# Patient Record
Sex: FEMALE | Race: WHITE | NOT HISPANIC OR LATINO
[De-identification: names, ages, dates, MRNs, and addresses within clinical notes are randomized per-mention and may not be internally consistent; named-entity substitution may affect disease eponyms.]

---

## 2019-05-14 ENCOUNTER — FORM ENCOUNTER (OUTPATIENT)
Age: 54
End: 2019-05-14

## 2019-05-22 ENCOUNTER — FORM ENCOUNTER (OUTPATIENT)
Age: 54
End: 2019-05-22

## 2019-06-03 ENCOUNTER — FORM ENCOUNTER (OUTPATIENT)
Age: 54
End: 2019-06-03

## 2019-06-09 ENCOUNTER — FORM ENCOUNTER (OUTPATIENT)
Age: 54
End: 2019-06-09

## 2019-06-11 ENCOUNTER — FORM ENCOUNTER (OUTPATIENT)
Age: 54
End: 2019-06-11

## 2019-06-24 NOTE — HP
Admitting History and Physical





- Primary Care Physician


PCP: Josfaat Berger





- Admission


Chief Complaint: Left breast cancer


History of Present Illness: 





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 

sister BRCA 1 positive with breast cancer at age 60 and a brother BRCA1 

positive with pancreatic cancer.She underwent screening mammogram and US 5/2019 

showing calcifications and slight associated density UIQ .9cm x1.5x1.1cm  at 1:

00 2 cm FN was seen in  left breast  on  US. There was also a separate 

suspicious densoity left breast 11:00 3 cm FN 5x5 mm. US core bx of both of 

these showed on 5/2019 invasive mammary carcinoma with mixed ductal and lobular 

features ER+/MS+ and HER2-. Breast MRI showed localized left breast  cancer and 

bno adenopathy.


History Source: Patient


Limitations to Obtaining History: No Limitations





- Past Medical History


Additional Past Medical History: 





Obesity





- Past Surgical History


Additional Past Surgical History: 





right thigh excision  lipoma


right core bx LCIS 2016 no excision follow up wnl





- Smoking History


Smoking history: Never smoked


Have you smoked in the past 12 months: No





- Alcohol/Substance Use


Hx Alcohol Use: Yes (social)





Home Medications





- Allergies


Allergies/Adverse Reactions: 


 Allergies











Allergy/AdvReac Type Severity Reaction Status Date / Time


 


No Known Allergies Allergy   Verified 06/24/19 13:39














Family Disease History





- Family Disease History


Family Disease History: CA: Grandparent (pat GM ovarian ca 40), Father (CRC 68)

, Mother (lung ca), Brother (pancreatic ca BRCA1+// bother prostate ca 65), 

Sister (breast ca BRCA1+)


Other Family History: pat uncle breast ca 80





Physical Examination


Constitutional: Yes: Obese


Breast(s): Yes: Other ( ptotic D cup breastspost bx scarring changes upper 

aspect left breast no adenopathy or palpated masses)





Problem List





- Problems


(1) Breast cancer, left breast


Code(s): C50.912 - MALIGNANT NEOPLASM OF UNSPECIFIED SITE OF LEFT FEMALE BREAST

   


Qualifiers: 


   Breast location: upper inner quadrant of breast   Estrogen receptor status: 

positive   Patient sex: female   Qualified Code(s): C50.212 - Malignant 

neoplasm of upper-inner quadrant of left female breast; Z17.0 - Estrogen 

receptor positive status [ER+]   





Assessment/Plan





Bilateral total mastectomies left sentenel node biopsy ,lymphoscintogram,

possible axillary node dissection KADI

## 2019-06-26 ENCOUNTER — FORM ENCOUNTER (OUTPATIENT)
Age: 54
End: 2019-06-26

## 2019-06-27 ENCOUNTER — HOSPITAL ENCOUNTER (INPATIENT)
Dept: HOSPITAL 74 - JSAMEDAYSX | Age: 54
LOS: 3 days | Discharge: HOME | DRG: 580 | End: 2019-06-30
Attending: SURGERY | Admitting: SURGERY
Payer: COMMERCIAL

## 2019-06-27 VITALS — BODY MASS INDEX: 48.9 KG/M2

## 2019-06-27 DIAGNOSIS — C77.3: ICD-10-CM

## 2019-06-27 DIAGNOSIS — Z17.0: ICD-10-CM

## 2019-06-27 DIAGNOSIS — E66.01: ICD-10-CM

## 2019-06-27 DIAGNOSIS — C50.412: ICD-10-CM

## 2019-06-27 DIAGNOSIS — C50.212: Primary | ICD-10-CM

## 2019-06-27 PROCEDURE — A9541 TC99M SULFUR COLLOID: HCPCS

## 2019-06-27 PROCEDURE — 0HTV0ZZ RESECTION OF BILATERAL BREAST, OPEN APPROACH: ICD-10-PCS | Performed by: SURGERY

## 2019-06-27 PROCEDURE — 0HRV077 REPLACEMENT OF BILATERAL BREAST USING DEEP INFERIOR EPIGASTRIC ARTERY PERFORATOR FLAP, OPEN APPROACH: ICD-10-PCS | Performed by: PLASTIC SURGERY

## 2019-06-27 PROCEDURE — 07B60ZX EXCISION OF LEFT AXILLARY LYMPHATIC, OPEN APPROACH, DIAGNOSTIC: ICD-10-PCS | Performed by: SURGERY

## 2019-06-27 RX ADMIN — CEFAZOLIN SCH MLS/HR: 1 INJECTION, POWDER, FOR SOLUTION INTRAVENOUS at 23:42

## 2019-06-27 RX ADMIN — ACETAMINOPHEN PRN MG: 10 INJECTION, SOLUTION INTRAVENOUS at 19:45

## 2019-06-27 RX ADMIN — ASPIRIN 325 MG ORAL TABLET SCH MG: 325 PILL ORAL at 19:30

## 2019-06-27 RX ADMIN — DOCUSATE SODIUM SCH: 100 CAPSULE, LIQUID FILLED ORAL at 23:43

## 2019-06-27 RX ADMIN — Medication SCH: at 19:53

## 2019-06-27 RX ADMIN — Medication SCH MG: at 18:35

## 2019-06-27 RX ADMIN — ACETAMINOPHEN PRN MG: 10 INJECTION, SOLUTION INTRAVENOUS at 23:44

## 2019-06-27 NOTE — CONSULT
Consultation: 


CONSULT SERVICE: ICU Resident





HISTORY OF PRESENT ILLNESS:


   52yo F with no significant history who presents today after b/l mastectomy 

with reconstruction in KADI flap creation and sentinel node resection performed 

by Dr. Berger. Mastectomy was performed due to pt having 1st degree relative 

BRCA I positive with multiple masses seen on mammography and follow-up 

ultrasound. Pt is placed in ICU for doppler hourly check of KADI flap. Pt's 

procedure was uncomplicated, had minimal EBL, and received 4L of IVF 

intraoperatively. Pt recovered without complication and is doing well with no 

complaints of pain at this time. 





FamHx:


   Sister - BRCA 1 +; Breast Ca 61yo


   Brother - BRCA 1 +; pancreatic Ca











REVIEW OF SYSTEMS:


As per HPI





PHYSICAL EXAMINATION





 Vital Signs 











  06/27/19 06/27/19 06/27/19





  06:49 06:50 18:21


 


Temperature 98.5 F  98.1 F


 


Pulse Rate 75  64


 


Respiratory 18  14





Rate   


 


Blood Pressure 140/88  112/46 L


 


O2 Sat by Pulse  99 100





Oximetry (%)   














  06/27/19 06/27/19 06/27/19





  18:35 18:50 19:05


 


Temperature   


 


Pulse Rate 67 70 69


 


Respiratory 14 16 18





Rate   


 


Blood Pressure 98/61 110/57 L 106/58 L


 


O2 Sat by Pulse 97 98 100





Oximetry (%)   














  06/27/19 06/27/19 06/27/19





  19:20 19:35 19:50


 


Temperature   


 


Pulse Rate 67 65 70


 


Respiratory 16 14 14





Rate   


 


Blood Pressure 107/64 115/60 104/66


 


O2 Sat by Pulse 100 100 100





Oximetry (%)   














  06/27/19 06/27/19





  20:05 20:20


 


Temperature  


 


Pulse Rate 66 65


 


Respiratory 14 14





Rate  


 


Blood Pressure 115/60 119/67


 


O2 Sat by Pulse 100 100





Oximetry (%)  














GENERAL: NAD, Awake, alert, and fully oriented 


HEENT: NC/AT, EOMI, LIN, sclera anicteric, MMM


NECK: No JVD 


LUNGS: Posterior lung auscultation CTA b/l. No wheezes, and no crackles. No 

accessory muscle use. RA SpO2 98%


DOPPLERS: strong doppler sounds to b/l KADI flaps


HEART: RRR, normal S1 and S2 without murmurs


ABDOMEN: Soft, NT/ND, hypoactive bowel sounds, no guarding, no rebound.


EXTREMITIES: 2+ DP pulses, warm, well-perfused. No calf tenderness. No edema. 


NEUROLOGICAL:  CNs II-XII intact. Strength in all extremities 5/5. Sensation 

intact throughout. Normal speech. Gait not observed


PSYCHIATRIC: Cooperative. Good eye contact. Appropriate mood and affect.


SKIN: Warm, dry, no rashes. B/l incisions of chest without notable bleed.











 Laboratory Results - last 24 hr











  06/27/19 06/27/19 06/27/19





  06:34 06:34 07:20


 


Serum Pregnancy, Qual  Negative  


 


Blood Type   O POSITIVE  O POSITIVE


 


Antibody Screen   Negative 








Active Medications











Generic Name Dose Route Start Last Admin





  Trade Name Freq  PRN Reason Stop Dose Admin


 


Acetaminophen  1,000 mg  06/27/19 19:33  06/27/19 19:45





  Ofirmev Injection -  IVPB   1,000 mg





  Q6H PRN   Administration





  PAIN LEVEL 1-5   





     





     





     


 


Aspirin  325 mg  06/28/19 10:00  06/27/19 19:30





  Asa -  PO   325 mg





  DAILY JACQUELINE   Administration





     





     





     





     


 


Diazepam  5 mg  06/27/19 18:00  





  Valium -  PO   





  Q8H PRN   





  MUSCLE SPASMS   





     





     





     


 


Docusate Sodium  100 mg  06/27/19 22:00  





  Colace -  PO   





  BID [facility]   





     





     





     





     


 


Enoxaparin Sodium  30 mg  06/28/19 10:00  





  Lovenox -  SQ   





  BID [facility]   





     





     





     





     


 


Fentanyl  50 mcg  06/27/19 10:08  





  Sublimaze Injection -  IVPUSH   





  [id number] PRN   





  PAIN-PACU ORDER X 4 DOSES ONLY   





     





     





     


 


Dextrose/Sodium Chloride  1,000 mls @ 125 mls/hr  06/27/19 18:00  06/27/19 18:35





  D5-1/2ns -  IV   125 mls/hr





  ASDIR JACQUELINE   Administration





     





     





     





     


 


Cefazolin Sodium 1 gm/  50 mls @ 100 mls/hr  06/27/19 23:30  





  Dextrose  IVPB  06/28/19 23:29  





  Q6H JACQUELINE   





     





     





     





     


 


Ondansetron HCl  4 mg  06/27/19 10:08  





  Zofran Injection  IVPUSH   





  Q6H PRN   





  NAUSEA AND/OR VOMITING   





     





     





     


 


Oxycodone HCl  10 mg  06/27/19 18:00  





  Roxicodone -  PO   





  Q4H PRN   





  PAIN LEVEL 6-10   





     





     





     











ASSESSMENT/PLAN:


B/l Mastectomy with KADI flap creation and sentinel node resection





--Monitor in ICU due to acute doppler checks (q1h)


   --Ideal MAPs of 70 with tight control to avoid underperfusion with KADI flap 

vs. overcongestion of perforators


--Maintain in beach-chair positioning to avoid excessive epigastric  

tension


--Pain well controlled due to Exporel tap


   --Will continue with Oxycodone 5mg/Oxycodone 10mg PRN depending on pt's 

severity


   --Tylenol IVPB on board for mild pain


   --Valium 5mg PO on board PRN for muscle spasms if they develop


--Continue perioperative ABX per SCIP protocol


--Continue D5-1/2NS@125cc/hr while patient transitioning back to PO intake





FEN:   


   Fluids: As above


   Electrolyte abnormalities: AM basic labs


   Nutrition: Transition as patient tolerate and as per surgical team





PPX:


   DVT - Surgical team started on Lovenox 30mg BID SQ


   GI - Not indicated currently





Dispo: Doppler checks; monitor in ICU


Carlo Beckford DO - IM PGY-2





Visit type





- Emergency Visit


Emergency Visit: Yes


ED Registration Date: 06/27/19


Care time: The patient presented to the Emergency Department on the above date 

and was hospitalized for further evaluation of their emergent condition.





- New Patient


This patient is new to me today: Yes


Date on this admission: 06/28/19





- Critical Care


Critical Care patient: Yes


Total Critical Care Time (in minutes): 35


Critical Care Statement: The care of this patient involved high complexity 

decision making to prevent further life threatening deterioration of the patient

's condition and/or to evaluate & treat vital organ system(s) failure or risk 

of failure.

## 2019-06-28 LAB
ANION GAP SERPL CALC-SCNC: 6 MMOL/L (ref 8–16)
BUN SERPL-MCNC: 23 MG/DL (ref 7–18)
CALCIUM SERPL-MCNC: 7.8 MG/DL (ref 8.5–10.1)
CHLORIDE SERPL-SCNC: 107 MMOL/L (ref 98–107)
CO2 SERPL-SCNC: 23 MMOL/L (ref 21–32)
CREAT SERPL-MCNC: 0.9 MG/DL (ref 0.55–1.3)
DEPRECATED RDW RBC AUTO: 14 % (ref 11.6–15.6)
GLUCOSE SERPL-MCNC: 138 MG/DL (ref 74–106)
HCT VFR BLD CALC: 30.7 % (ref 32.4–45.2)
HGB BLD-MCNC: 10.4 GM/DL (ref 10.7–15.3)
MCH RBC QN AUTO: 32.4 PG (ref 25.7–33.7)
MCHC RBC AUTO-ENTMCNC: 33.9 G/DL (ref 32–36)
MCV RBC: 95.6 FL (ref 80–96)
PLATELET # BLD AUTO: 197 K/MM3 (ref 134–434)
PMV BLD: 8.6 FL (ref 7.5–11.1)
POTASSIUM SERPLBLD-SCNC: 4.2 MMOL/L (ref 3.5–5.1)
RBC # BLD AUTO: 3.21 M/MM3 (ref 3.6–5.2)
SODIUM SERPL-SCNC: 136 MMOL/L (ref 136–145)
WBC # BLD AUTO: 13.3 K/MM3 (ref 4–10)

## 2019-06-28 RX ADMIN — ACETAMINOPHEN PRN MG: 10 INJECTION, SOLUTION INTRAVENOUS at 05:40

## 2019-06-28 RX ADMIN — ACETAMINOPHEN PRN MG: 10 INJECTION, SOLUTION INTRAVENOUS at 11:42

## 2019-06-28 RX ADMIN — ENOXAPARIN SODIUM SCH MG: 30 INJECTION SUBCUTANEOUS at 10:00

## 2019-06-28 RX ADMIN — DOCUSATE SODIUM SCH MG: 100 CAPSULE, LIQUID FILLED ORAL at 22:15

## 2019-06-28 RX ADMIN — ASPIRIN 325 MG ORAL TABLET SCH MG: 325 PILL ORAL at 10:00

## 2019-06-28 RX ADMIN — CEFAZOLIN SCH MLS/HR: 1 INJECTION, POWDER, FOR SOLUTION INTRAVENOUS at 18:33

## 2019-06-28 RX ADMIN — ENOXAPARIN SODIUM SCH MG: 30 INJECTION SUBCUTANEOUS at 22:15

## 2019-06-28 RX ADMIN — ACETAMINOPHEN PRN MG: 500 TABLET, FILM COATED ORAL at 19:49

## 2019-06-28 RX ADMIN — CEFAZOLIN SCH MLS/HR: 1 INJECTION, POWDER, FOR SOLUTION INTRAVENOUS at 05:47

## 2019-06-28 RX ADMIN — DOCUSATE SODIUM SCH MG: 100 CAPSULE, LIQUID FILLED ORAL at 10:00

## 2019-06-28 RX ADMIN — DEXTROSE AND SODIUM CHLORIDE SCH MLS/HR: 5; 450 INJECTION, SOLUTION INTRAVENOUS at 12:00

## 2019-06-28 RX ADMIN — CEFAZOLIN SCH MLS/HR: 1 INJECTION, POWDER, FOR SOLUTION INTRAVENOUS at 11:42

## 2019-06-28 NOTE — PN
Progress Note (short form)





- Note


Progress Note: 





Anesthesia POD#1


S/P Bilateral mastectomy with flap reconstruction under GA


VSS,no N/V,using Dilaudid PCA for pain,drowsy at this time.


Not taking PO meds yet.





A/P


Doing well. Continue IV PCA for today.





Shraddha Ragsdale MD

## 2019-06-28 NOTE — PN
Progress Note (short form)





- Note


Progress Note: 











POD 1, s/p B/l total mastectomies w/ L axillary sentinel lymph node biopsy/L 

axillary lymph node dissection with B/L KADI flap reconstruction for L breast CA








Pt seen and examined. States she is not having any pain currently. Reports lack 

of sleep overnight due to flap checks. Tolerating clears, no n/v. Cline in 

place. No complaints or issues overnight. Denies cp/sob, calf pain/edema. 

















 Vital Signs











Temp  98.8 F   06/28/19 05:21


 


Pulse  77   06/28/19 06:21


 


Resp  25 H  06/28/19 06:21


 


BP  115/65   06/28/19 06:21


 


Pulse Ox  95   06/28/19 03:50








 Intake & Output











 06/27/19 06/27/19 06/28/19





 11:59 23:59 11:59


 


Intake Total 4000 300 1595


 


Output Total 650 735 880


 


Balance 3350 -435 715


 


Weight   312 lb


 


Intake:   


 


  IV 4000 300 875


 


    D5-1/2Ns - 1,000 ml @ 125   875





    mls/hr IV ASDIR JACQUELINE Rx#:   





    [id number]   


 


  Oral   720


 


Output:   


 


  Drainage  435 580


 


    #1 Right Upper Breast  30 60


 


    #2 Right Lower Abdomen  20 50


 


    #3 Left Upper Breast  15 95


 


    #4 Left Upper Breast  50 105


 


    Breast #4   5


 


    Left Lower Abdomen  15 60


 


    Left Upper Breast #3   95


 


    Right Upper Breast #1   60


 


    Rt Lower Abd #2   50


 


  Urine 400 300 300


 


    Cline   300


 


  Estimated Blood Loss 250  


 


Other:   


 


  Voiding Method   Indwelling Catheter


 


  Bowel Movement   No: + flatus


 


  Weight Measurement Method   Built in [facility]








 CBC, BMP





 06/28/19 07:24 





 06/28/19 07:24 











Gen: awake, alert, nad


Resp: Unlabored on RA


Chest: Gabi hugger and bra in place, removed for exam. B/L flaps pale, remain 

warm to touch with Gabi hugger off for approx 15 mins (L slightly warmer than R)

. +Surrounding ecchymosis bilaterally. Strong doppler signal present on L at 

suture site and on R at mid upper flap. Incisions all intact with no drainage 

noted. R STANLEY with approx 15 ml serosanguinous drainage in reservoir, L breast 

with JPs x2 (#3 drain with scant serosanguinous drainage in reservoir, #4 drain 

with approx 100ml serosanguinous drainage in reservoir). All drains stripped. 


Abdo: Incision intact with no erythema or drainage noted. B/L STANLEY drains with 

minimal serosanginous output in reservoirs (both drains stripped)

















A/P: 52 y/o F w/ PMHx morbid obesity, L breast CA, now POD 1, s/p B/l total 

mastectomies w/ L axillary sentinel lymph node biopsy/L axillary lymph node 

dissection with B/L KADI flap reconstruction.








Afebrile, VSS. 


Labs wnl.


B/L flaps pale, however with excellent doppler signal and remain warm with Gabi 

hugger off for >10mins





STANLEY outputs:


#1 R Upper Breast: 60ml overnight, 90ml since OR


#2 R Lower Abdomen: 50ml overnight, 70ml since OR


#3 L Upper Breast: 95ml overnight, 110ml since OR


#4 L Upper Breast: 105ml overnight, 155ml since OR with additional 100ml 

emptied at time of exam


#5 L Lower Abdomen: 60ml overnight, 75ml since OR





Cline output: 300ml overnight 








-Continue to monitor pt in ICU


-Continue q1hrs doppler checks


-Keep Bare hugger in place at all times 


-Keep pt in beach chair position


-Pain control as ordered


-Continue periop abx 


-Keep JPs in place, strip drains qshift


-Keep cline in place


-Monitor I&Os


-Clear liquid diet, NO CAFFEINE OR CHOCOLATE


-Continue ASA 325mg qd


-DVT prophylaxis with b/l scds and Lovenox 30mg bid


-Bowel regimen


-Above d/w ICU resident, please CALL SURGERY immediately if any issues with 

doppler signals or any changes with flaps


-Will follow throughout the day





d/w attending Dr Echeverria

## 2019-06-28 NOTE — PN
Progress Note, Physician


Chief Complaint: 





Left breast cancer S/P bilateral total mastctomies left sentenel node biopsy 

KADI reconstruction POD #1


History of Present Illness: 





Patient is drinking, pain controlled with current medication, cline to be dcd 

when OOB fluids decreased as patient is drinking, diet to be advanced 





- Current Medication List


Current Medications: 


Active Medications





Acetaminophen (Ofirmev Injection -)  1,000 mg IVPB Q6H PRN


   PRN Reason: PAIN LEVEL 1-5


   Last Admin: 06/28/19 05:40 Dose:  1,000 mg


Aspirin (Asa -)  325 mg PO DAILY [facility]


   Last Admin: 06/28/19 10:00 Dose:  325 mg


Diazepam (Valium -)  5 mg PO Q8H PRN


   PRN Reason: MUSCLE SPASMS


Docusate Sodium (Colace -)  100 mg PO BID [facility]


   Last Admin: 06/28/19 10:00 Dose:  100 mg


Enoxaparin Sodium (Lovenox -)  30 mg SQ BID [facility]


   Last Admin: 06/28/19 10:00 Dose:  30 mg


Fentanyl (Sublimaze Injection -)  50 mcg IVPUSH [id number] PRN


   PRN Reason: PAIN-PACU ORDER X 4 DOSES ONLY


Dextrose/Sodium Chloride (D5-1/2ns -)  1,000 mls @ 125 mls/hr IV ASDIR [facility]


   Last Admin: 06/27/19 18:35 Dose:  125 mls/hr


Cefazolin Sodium 1 gm/ (Dextrose)  50 mls @ 100 mls/hr IVPB Q6H [facility]


   Stop: 06/28/19 23:29


   Last Admin: 06/28/19 05:47 Dose:  100 mls/hr


Ondansetron HCl (Zofran Injection)  4 mg IVPUSH Q6H PRN


   PRN Reason: NAUSEA AND/OR VOMITING


Oxycodone HCl (Roxicodone -)  10 mg PO Q4H PRN


   PRN Reason: PAIN LEVEL 6-10


Oxycodone HCl (Roxicodone -)  5 mg PO Q4H PRN


   PRN Reason: PAIN LEVEL 1-5


   Last Admin: 06/28/19 11:14 Dose:  5 mg











- Objective


Vital Signs: 


 Vital Signs











Temperature  99.5 F   06/28/19 08:00


 


Pulse Rate  78   06/28/19 10:00


 


Respiratory Rate  20   06/28/19 10:00


 


Blood Pressure  122/69   06/28/19 10:00


 


O2 Sat by Pulse Oximetry (%)  96   06/28/19 10:00











Constitutional: Yes: No Distress


Breast(s): Yes: Other (Bilateral flaps viable moderate echymosis ,good color  

Pulses strong and equal biphasic pulse, abdominal incision intact no signs of 

hematoma or infection drains milked and)


Labs: 


 CBC, BMP





 06/28/19 07:24 





 06/28/19 07:24 











Problem List





- Problems


(1) Breast cancer, left breast


Code(s): C50.912 - MALIGNANT NEOPLASM OF UNSPECIFIED SITE OF LEFT FEMALE BREAST

   


Qualifiers: 


   Breast location: upper inner quadrant of breast   Estrogen receptor status: 

positive   Patient sex: female   Qualified Code(s): C50.212 - Malignant 

neoplasm of upper-inner quadrant of left female breast; Z17.0 - Estrogen 

receptor positive status [ER+]   





Assessment/Plan





spirometry


SCD in bed Lovenox SQ BID


oxycodone for pain prn


DC son OOB in chair with assistance oper Dr echeverria


physical therapist for ambulation in flexed position at all times per Dr echeverria


monitor flap pulse Q2 hours per Dr echeverria


VNS


DC jluius gibbsgger per Dr Echeverria


advanced diet excluding chocolate and caffeine

## 2019-06-28 NOTE — PN
Physical Exam: 


SUBJECTIVE: Patient seen and examined at bedside.





No overnight events or complaints. AOX3 , NAD, sitting up in bed eating 

breakfast. Admits to passing flatus this am, making urine. Pt remains in beach-

chair position to reduce tension on the abdomen.








OBJECTIVE:





 Vital Signs











 Period  Temp  Pulse  Resp  BP Sys/Michele  Pulse Ox


 


 Last 24 Hr  97.7 F-98.8 F  62-77  14-25  /46-73  











GENERAL: The patient is awake, alert, and fully oriented, in no acute distress.


HEAD: Normal with no signs of trauma.


EYES: PERRL, extraocular movements intact, sclera anicteric, conjunctiva clear. 

No ptosis. 


membranes.


NECK: Trachea midline, full range of motion, supple. 


LUNGS: Breath sounds equal, clear to auscultation bilaterally, no wheezes, no 

crackles, no 


accessory muscle use. 


HEART: Regular rate and rhythm, S1, S2 without murmur, rub or gallop.


ABDOMEN: Soft, nontender, nondistended, normoactive bowel sounds, no guarding, 

no 


rebound, no hepatosplenomegaly, no masses.


EXTREMITIES: 2+ pulses, warm, well-perfused, no edema. 


NEUROLOGICAL: Cranial nerves II through XII grossly intact. Normal speech, gait 

not 


observed.


PSYCH: Normal mood, normal affect.


SKIN: Warm, dry, normal turgor, no rashes or lesions noted. Bilateral 

mastectomy with reconstruction with flap. Doppler positive for flow bilateral 

breast flap, no drainage.














 Laboratory Results - last 24 hr











  06/27/19 06/27/19 06/28/19





  06:34 07:20 07:24


 


WBC    13.3 H


 


RBC    3.21 L


 


Hgb    10.4 L


 


Hct    30.7 L


 


MCV    95.6


 


MCH    32.4


 


MCHC    33.9


 


RDW    14.0


 


Plt Count    197


 


MPV    8.6


 


Sodium   


 


Potassium   


 


Chloride   


 


Carbon Dioxide   


 


Anion Gap   


 


BUN   


 


Creatinine   


 


Est GFR (CKD-EPI)AfAm   


 


Est GFR (CKD-EPI)NonAf   


 


Random Glucose   


 


Calcium   


 


Blood Type  O POSITIVE  O POSITIVE 


 


Antibody Screen  Negative  














  06/28/19





  07:24


 


WBC 


 


RBC 


 


Hgb 


 


Hct 


 


MCV 


 


MCH 


 


MCHC 


 


RDW 


 


Plt Count 


 


MPV 


 


Sodium  136


 


Potassium  4.2


 


Chloride  107


 


Carbon Dioxide  23


 


Anion Gap  6 L


 


BUN  23.0 H


 


Creatinine  0.9


 


Est GFR (CKD-EPI)AfAm  84.61


 


Est GFR (CKD-EPI)NonAf  73.00


 


Random Glucose  138 H


 


Calcium  7.8 L


 


Blood Type 


 


Antibody Screen 








Active Medications











Generic Name Dose Route Start Last Admin





  Trade Name Freq  PRN Reason Stop Dose Admin


 


Acetaminophen  1,000 mg  06/27/19 19:33  06/28/19 05:40





  Ofirmev Injection -  IVPB   1,000 mg





  Q6H PRN   Administration





  PAIN LEVEL 1-5   





     





     





     


 


Aspirin  325 mg  06/28/19 10:00  06/27/19 19:30





  Asa -  PO   325 mg





  DAILY JACQUELINE   Administration





     





     





     





     


 


Diazepam  5 mg  06/27/19 18:00  





  Valium -  PO   





  Q8H PRN   





  MUSCLE SPASMS   





     





     





     


 


Docusate Sodium  100 mg  06/27/19 22:00  06/27/19 23:43





  Colace -  PO   Not Given





  BID JACQUELINE   





     





     





     





     


 


Enoxaparin Sodium  30 mg  06/28/19 10:00  





  Lovenox -  SQ   





  BID JACQUELINE   





     





     





     





     


 


Fentanyl  50 mcg  06/27/19 10:08  





  Sublimaze Injection -  IVPUSH   





  [id number] PRN   





  PAIN-PACU ORDER X 4 DOSES ONLY   





     





     





     


 


Dextrose/Sodium Chloride  1,000 mls @ 125 mls/hr  06/27/19 18:00  06/27/19 18:35





  D5-1/2ns -  IV   125 mls/hr





  ASDIR JACQUELINE   Administration





     





     





     





     


 


Cefazolin Sodium 1 gm/  50 mls @ 100 mls/hr  06/27/19 23:30  06/28/19 05:47





  Dextrose  IVPB  06/28/19 23:29  100 mls/hr





  Q6H JACQUELINE   Administration





     





     





     





     


 


Ondansetron HCl  4 mg  06/27/19 10:08  





  Zofran Injection  IVPUSH   





  Q6H PRN   





  NAUSEA AND/OR VOMITING   





     





     





     


 


Oxycodone HCl  10 mg  06/27/19 18:00  





  Roxicodone -  PO   





  Q4H PRN   





  PAIN LEVEL 6-10   





     





     





     











ASSESSMENT/PLAN:








B/l Mastectomy with KADI flap creation and sentinel node resection





Discussed case with GUS Reyes who states the Surgeon will assess the pt in the 

afternoon and to continue Q1H doppler for flow.





--Monitor in ICU due to acute doppler checks (q1h)


   --Ideal MAPs of 70 with tight control to avoid underperfusion with KADI flap 

vs. overcongestion of perforators


--Maintain in beach-chair positioning to avoid excessive epigastric  

tension


--Pain well controlled due to Exporel tap


   --Will continue with Oxycodone 5mg/Oxycodone 10mg PRN depending on pt's 

severity


   --Tylenol IVPB on board for mild pain


   --Valium 5mg PO on board PRN for muscle spasms if they develop


--Continue perioperative ABX per SCIP protocol


--Continue D5-1/2NS@125cc/hr while patient transitioning back to PO intake





FEN:   


   Fluids: As above


   Electrolyte abnormalities: AM basic labs


   Nutrition: Transition as patient tolerate and as per surgical team





PPX:


   DVT - Surgical team started on Lovenox 30mg BID SQ


   GI - Not indicated currently





Dispo: Doppler checks Q1H; monitor in ICU





Visit type





- Emergency Visit


Emergency Visit: No





- New Patient


This patient is new to me today: Yes


Date on this admission: 07/04/19





- Critical Care


Critical Care patient: Yes


Total Critical Care Time (in minutes): 40


Critical Care Statement: The care of this patient involved high complexity 

decision making to prevent further life threatening deterioration of the patient

's condition and/or to evaluate & treat vital organ system(s) failure or risk 

of failure.

## 2019-06-28 NOTE — PN
Progress Note (short form)





- Note


Progress Note: 





Addendem by anesthesia


She is not on PCA.


Doing well on intermittent doses.


Taking oral food well.





Shraddha Ragsdale MD

## 2019-06-28 NOTE — PN
Progress Note (short form)





- Note


Progress Note: 





POD#1 s/p bilateral skin-sparing mastectomies, left ALNDx, and immediate 

reconstruction with bilateral KADI flaps.  Overnight, she has been stable with 

pain controlled with IV Tylenol and PO Oxycodone.  Tolerating clear liquids.





AVSS


I/O (Including OR from yesterday): 4300/1385


UO:70cc


STANLEY: 120/95/95/165/5





On exam, she is awake, alert, mentating normally.


Bilateral breasts are soft. Appropriate ecchymosis of bilateral mastectomy skin 

flaps.  KADI skin paddles are warm with biphasic doppler signals.  STANLEY output 

serosang.


Abdomen is soft, obese, ND.  Incisions C/D/I. STANLEY drainage serosang.





A/P:


-Advance diet as tolerated (no caffeine or chocolate)


-Decrease IVF to 75cc/hr->may hep lock once good PO


-May decrease flap checks to Q2 hours


-D/C Bare hugger


-OOB to chair, then ambulate with PT


-D/C Pardo once OOB


-DVT prophylaxis (Lovenox and SCD boots)


-Daily ASA


-Encourage IS


-Continue IV Abx


-No more labs unless clinically indicated

## 2019-06-28 NOTE — PN
Teaching Attending Note


Name of Resident: Shadi Collazo





ATTENDING PHYSICIAN STATEMENT





I saw and evaluated the patient.


I reviewed the resident's note and discussed the case with the resident.


I agree with the resident's findings and plan as documented.








SUBJECTIVE:


Patient seen and examined in the ICU.  Awake and alert.  Pain and discomfort at 

the surgical site. 


Has requested percocet. 


Apparently some there is a slight color change (seen by surgical PA this AM and 

pending evaluation by plastic surgery). 


No SOB. 





 Intake & Output











 06/25/19 06/26/19 06/27/19 06/28/19





 23:59 23:59 23:59 23:59


 


Intake Total   4300 1595


 


Output Total   1385 1045


 


Balance   2915 550


 


Weight 295 lb   312 lb








 Last Vital Signs











Temp Pulse Resp BP Pulse Ox


 


 99.5 F   78   20   122/69   96 


 


 06/28/19 08:00  06/28/19 10:00  06/28/19 10:00  06/28/19 10:00  06/28/19 10:00








Active Medications





Acetaminophen (Ofirmev Injection -)  1,000 mg IVPB Q6H PRN


   PRN Reason: PAIN LEVEL 1-5


   Last Admin: 06/28/19 05:40 Dose:  1,000 mg


Aspirin (Asa -)  325 mg PO DAILY [facility]


   Last Admin: 06/28/19 10:00 Dose:  325 mg


Diazepam (Valium -)  5 mg PO Q8H PRN


   PRN Reason: MUSCLE SPASMS


Docusate Sodium (Colace -)  100 mg PO BID [facility]


   Last Admin: 06/28/19 10:00 Dose:  100 mg


Enoxaparin Sodium (Lovenox -)  30 mg SQ BID [facility]


   Last Admin: 06/28/19 10:00 Dose:  30 mg


Fentanyl (Sublimaze Injection -)  50 mcg IVPUSH [id number] PRN


   PRN Reason: PAIN-PACU ORDER X 4 DOSES ONLY


Dextrose/Sodium Chloride (D5-1/2ns -)  1,000 mls @ 125 mls/hr IV ASDIR [facility]


   Last Admin: 06/27/19 18:35 Dose:  125 mls/hr


Cefazolin Sodium 1 gm/ (Dextrose)  50 mls @ 100 mls/hr IVPB Q6H [facility]


   Stop: 06/28/19 23:29


   Last Admin: 06/28/19 05:47 Dose:  100 mls/hr


Ondansetron HCl (Zofran Injection)  4 mg IVPUSH Q6H PRN


   PRN Reason: NAUSEA AND/OR VOMITING


Oxycodone HCl (Roxicodone -)  10 mg PO Q4H PRN


   PRN Reason: PAIN LEVEL 6-10


Oxycodone HCl (Roxicodone -)  5 mg PO Q4H PRN


   PRN Reason: PAIN LEVEL 1-5


   Last Admin: 06/28/19 11:14 Dose:  5 mg











OBJECTIVE:








GENERAL: The patient is awake, alert, and fully oriented, in no acute distress.


HEAD: Normal with no signs of trauma.


EYES: PERRL, extraocular movements intact, sclera anicteric, conjunctiva clear. 

No ptosis. 


NECK: Trachea midline, full range of motion, supple. 


CHEST: dressings and bandages intact 


LUNGS: Breath sounds equal, clear to auscultation bilaterally, no wheezes, no 

crackles, no accessory muscle use. 


HEART: Regular rate and rhythm, S1, S2 without murmur, rub or gallop.


ABDOMEN: Soft, nontender, nondistended, normoactive bowel sounds, no guarding, 

no rebound, no hepatosplenomegaly, no masses.


EXTREMITIES: 2+ pulses, warm, well-perfused, no edema. 


NEUROLOGICAL: Non-focal 


PSYCH: Normal mood, normal affect.


SKIN: Warm, dry, normal turgor, no rashes or lesions noted. Bilateral 

mastectomy with reconstruction with flap. Doppler positive for flow bilateral 

breast flap, no drainage.














 Laboratory Results - last 24 hr











  06/27/19 06/27/19 06/28/19





  06:34 07:20 07:24


 


WBC    13.3 H


 


RBC    3.21 L


 


Hgb    10.4 L


 


Hct    30.7 L


 


MCV    95.6


 


MCH    32.4


 


MCHC    33.9


 


RDW    14.0


 


Plt Count    197


 


MPV    8.6


 


Sodium   


 


Potassium   


 


Chloride   


 


Carbon Dioxide   


 


Anion Gap   


 


BUN   


 


Creatinine   


 


Est GFR (CKD-EPI)AfAm   


 


Est GFR (CKD-EPI)NonAf   


 


Random Glucose   


 


Calcium   


 


Blood Type  O POSITIVE  O POSITIVE 


 


Antibody Screen  Negative  














  06/28/19





  07:24


 


WBC 


 


RBC 


 


Hgb 


 


Hct 


 


MCV 


 


MCH 


 


MCHC 


 


RDW 


 


Plt Count 


 


MPV 


 


Sodium  136


 


Potassium  4.2


 


Chloride  107


 


Carbon Dioxide  23


 


Anion Gap  6 L


 


BUN  23.0 H


 


Creatinine  0.9


 


Est GFR (CKD-EPI)AfAm  84.61


 


Est GFR (CKD-EPI)NonAf  73.00


 


Random Glucose  138 H


 


Calcium  7.8 L


 


Blood Type 


 


Antibody Screen 











ASSESSMENT/PLAN:


POD# 1 B/l Mastectomy with KADI flap creation and sentinel node resection


Morbid Obesity 


(?) OSAS 





Plastic surgery follow up.


O2 as needed to maintain saturation 


Incentive Spirometry


Local wound care per surgery 


IVF 


PO as tolerated 


Pain control 


VTE prophylaxis 


Requires ICU monitoring for Q1 doppler checks 





Dr Saldana 


Critical care time spent in reviewing chart, evaluating patient and formulating 

plan - 36 minutes.

## 2019-06-29 RX ADMIN — ACETAMINOPHEN PRN MG: 500 TABLET, FILM COATED ORAL at 23:42

## 2019-06-29 RX ADMIN — DEXTROSE AND SODIUM CHLORIDE SCH MLS/HR: 5; 450 INJECTION, SOLUTION INTRAVENOUS at 06:01

## 2019-06-29 RX ADMIN — DOCUSATE SODIUM SCH MG: 100 CAPSULE, LIQUID FILLED ORAL at 10:00

## 2019-06-29 RX ADMIN — ACETAMINOPHEN PRN MG: 500 TABLET, FILM COATED ORAL at 07:46

## 2019-06-29 RX ADMIN — ENOXAPARIN SODIUM SCH MG: 30 INJECTION SUBCUTANEOUS at 21:26

## 2019-06-29 RX ADMIN — DOCUSATE SODIUM SCH MG: 100 CAPSULE, LIQUID FILLED ORAL at 21:26

## 2019-06-29 RX ADMIN — ASPIRIN 325 MG ORAL TABLET SCH MG: 325 PILL ORAL at 09:54

## 2019-06-29 RX ADMIN — ENOXAPARIN SODIUM SCH MG: 30 INJECTION SUBCUTANEOUS at 09:54

## 2019-06-29 RX ADMIN — ACETAMINOPHEN PRN MG: 500 TABLET, FILM COATED ORAL at 02:27

## 2019-06-29 NOTE — PN
Progress Note, Physician


Chief Complaint: 





Left breast cancer multicentric s/p bilateral total mastectomies with left 

axillary sentinel node biopsy and axillary lymph node dissection with bilateral 

KADI flap reconstruction


History of Present Illness: 





The patient was diagnosed with left breast multicentric breast cancer in the 

UOQ and UIQ after screening mammography and ultrasound and required a left 

breast mastectomy and decided on bilateral mastectomies which was performed 

with bilateral KADI reconstructions on June 27. She was admitted 

postoperatively for post op pain and wound management. 





- Current Medication List


Current Medications: 


Active Medications





Acetaminophen (Tylenol -)  1,000 mg PO Q6H PRN


   PRN Reason: PAIN OR FEVER


   Last Admin: 06/29/19 07:46 Dose:  1,000 mg


Aspirin (Asa -)  325 mg PO DAILY [facility]


   Last Admin: 06/28/19 10:00 Dose:  325 mg


Diazepam (Valium -)  5 mg PO Q8H PRN


   PRN Reason: MUSCLE SPASMS


   Last Admin: 06/29/19 07:46 Dose:  5 mg


Docusate Sodium (Colace -)  100 mg PO BID [facility]


   Last Admin: 06/28/19 22:15 Dose:  100 mg


Enoxaparin Sodium (Lovenox -)  30 mg SQ BID [facility]


   Last Admin: 06/28/19 22:15 Dose:  30 mg


Dextrose/Sodium Chloride (D5-1/2ns -)  1,000 mls @ 75 mls/hr IV ASDIR [facility]


   Last Admin: 06/29/19 06:01 Dose:  75 mls/hr


Ondansetron HCl (Zofran Injection)  4 mg IVPUSH Q6H PRN


   PRN Reason: NAUSEA AND/OR VOMITING


Ranitidine HCl (Zantac -)  150 mg PO DAILY [facility]N


   PRN Reason: INDIGESTION


   Last Admin: 06/28/19 18:37 Dose:  150 mg


Tramadol HCl (Ultram -)  50 mg PO Q4H PRN


   PRN Reason: PAIN LEVEL 6-10


   Last Admin: 06/29/19 05:59 Dose:  50 mg











- Objective


Vital Signs: 


 Vital Signs











Temperature  98.7 F   06/29/19 06:00


 


Pulse Rate  75   06/29/19 06:00


 


Respiratory Rate  25 H  06/29/19 06:00


 


Blood Pressure  123/78   06/29/19 06:00


 


O2 Sat by Pulse Oximetry (%)  98   06/29/19 09:00











Constitutional: Yes: Well Nourished, No Distress, Calm


Eyes: Yes: WNL


HENT: Yes: WNL


Neck: Yes: WNL


Cardiovascular: Yes: Regular Rate and Rhythm


Respiratory: Yes: Regular, CTA Bilaterally


...Rectal Exam: Yes: Deferred


Genitourinary: Yes: WNL


Breast(s): Yes: Other (Bilateral mastectomy wounds clean, dry, intact.  Drains 

functioning well.  Excellent dopplerable pulses bilaterally.  Some bruising and 

moderate ischemia in skin flap on right lower native skin flap)


Wound/Incision: Yes: Clean/Dry, Well Approximated


Neurological: Yes: Alert, Oriented


...Motor Strength: WNL


Psychiatric: Yes: WNL


Labs: 


 CBC, BMP





 06/28/19 07:24 





 06/28/19 07:24 











Problem List





- Problems


(1) Breast cancer, left breast


Assessment/Plan: 


She is doing well POD#2 s/p bilateral total mastectomies with left axillary 

lymph node dissection and bilateral KADI flap reconstruction.  Incision wounds 

clean, dry, and intact.  Drains functioning well.  KADI flaps warm viable with 

excellent dopplerable signals.  Skin flaps viable but some ischemia on lower 

right native skin flap.  Good pain control now on tramadol since did not 

tolerate oxycontin.  OOB ambulating today.  Continue antibiotics.  tolerating 

diet.  Likely discharge tomorrow AM.


Code(s): C50.912 - MALIGNANT NEOPLASM OF UNSPECIFIED SITE OF LEFT FEMALE BREAST

   


Qualifiers: 


   Breast location: overlapping sites of breast   Estrogen receptor status: 

positive   Patient sex: female   Qualified Code(s): C50.812 - Malignant 

neoplasm of overlapping sites of left female breast; Z17.0 - Estrogen receptor 

positive status [ER+]

## 2019-06-29 NOTE — OP
DATE OF OPERATION:  06/27/2019

 

PREOPERATIVE DIAGNOSES:  

1.  Left breast cancer.

2.  Acquired absence of bilateral breasts and nipples.

 

POSTOPERATIVE DIAGNOSES:  

1.  Left breast cancer.

2.  Acquired absence of bilateral breasts and nipples.

 

PROCEDURES PERFORMED:  

1.  Immediate left breast reconstruction with deep inferior epigastric 

microvascular free flap. 

2.  Immediate right breast reconstruction with deep inferior epigastric 

microvascular free flap. 

3.  Bilateral partial 3rd rib resection. 

4.  Bilateral exploration of internal mammary vessels, with extensive

adventitectomies. 

5.  Bilateral mastectomy skin flap and lower abdominal flap intraoperative

angiography using indocyanine green.  

6.  Processing and interpretation of bilateral intraoperative angiography images.  

7.  Bilateral ultrasound-guided transversus abdominis plane regional nerve blocks.

 

ATTENDING SURGEON:  Mt Echeverria M.D.

 

CO-SURGEON:  Josafat Salcido M.D.

 

BREAST SURGEON:  Josafat Berger M.D. 

 

ANESTHESIA:  General endotracheal. 

 

ESTIMATED BLOOD LOSS:  250 mL. 

 

SPECIMENS:  

1.  Left total mastectomy per [facility] (1208 g). 

2.  Left axillary lymph node dissection per [facility]. 

3.  Right total mastectomy per [facility] (1143 g). 

 

DRAINS:   

1.  No. 19 round Zeb drain x2 to left breast. 

2.  No. 19 round Zeb drain x1 to right breast.

3.  No. 19 round Zeb drain x2 to abdomen. 

 

COMPLICATIONS:  None.

 

CONDITION:  Stable to the recovery room, extubated.

 

INDICATIONS:  The patient is a 53-year-old female with a recent diagnosis of left

breast cancer.  She has been recommended for a left skin-sparing mastectomy with

sentinel lymph node biopsy.  The patient has elected to undergo a right prophylactic

mastectomy as well.  The patient was evaluated preoperatively for immediate bilateral

breast reconstruction, and she is most appropriately a candidate for autologous

reconstruction using her lower abdominal tissue.  The risks, benefits and

alternatives of the reconstructive procedures were discussed with the patient

preoperatively in detail and all questions were answered.  The risks include but are

not limited to bleeding, infection, pain, need for revision or further surgery,

partial or complete flap loss, partial or complete skin loss, damage to neighboring

structures (including nerves, arteries, veins and tendons).  The patient understands

these risks and has elected to proceed with surgery. 

 

DESCRIPTION OF PROCEDURE:  After proper identification and marking of the patient in

the preoperative holding area, the patient was transported to the operating room and

placed supine on the table, where noninvasive anesthesia monitors were applied. 

Intravenous access was established.  General anesthesia was administered and the

patient was intubated without difficulty.  SCD boots were applied to bilateral

extremities.  Subcutaneous Lovenox was given.  Intravenous antibiotics were then

given.  A Pardo catheter was then placed.  At this point, the patient's bilateral

breasts, left upper extremity and abdomen and flanks were prepped and draped in the

usual sterile fashion.  

 

After a time-out was performed, Dr. Berger from [facility] proceeded

to perform bilateral skin-sparing mastectomies, as well as a left sentinel lymph node

biopsy.  The intraoperative frozen section on the lymph node was positive and

therefore a left axillary lymph node dissection was performed.  These procedures will

all be dictated separately by Dr. Berger. 

 

Concurrently, Dr. Salcido and I began the reconstruction, working independently as

co-surgeons, with separate instrument setups. 

 

Attention was first turned towards the abdomen, where skin hooks were placed at the

12 and 6 o'clock positions of the umbilicus.  The umbilicus was circumferentially

incised with a No. 15 blade.  Periumbilical dissection was then performed with

Metzenbaum scissors, with care taken to leave adequate periumbilical fat.  Next, the

superior and inferior limbs of the lower abdominal flap were incised.  The superior

limb was carried down through the full thickness of the subcutaneous tissue with

electrocautery, with care taken to bevel slightly outwards.  Once the anterior

abdominal wall was reached, the superior abdominal skin flap was raised up to the

xiphoid process in the midline and the costal margin bilaterally.  The inferior

incision was then carried down through the subcutaneous tissue in a layer by layer

fashion with electrocautery.  Bilateral superficial inferior epigastric veins were

noted to be quite large, and a more proximal dissection was performed on each of

these veins using microvascular instruments and techniques.  Care was taken to

individually identify side branches, and these were circumferentially dissected,

ligated and divided.  Once adequate length and caliber had been achieved in the

bilateral superficial veins, they were ligated and divided, and the remainder of the

subcutaneous tissue dissected down to the level of the anterior abdominal wall.  At

this point the lower abdominal tissue was incised in the midline and the dissection

carried down through the full thickness of the subcutaneous tissue to the level of

the linea alba. 

 

At this point, attention was turned towards the patient's right-sided lower abdominal

flap.  Zone 2 from this flap was excised and discarded, given the amount of donor

site volume.  The lower abdominal flap was then raised from both medial and lateral

directions, just above the level of the anterior abdominal wall.  There were noted to

be 3 large lateral row perforators just below the level of the umbilicus, sitting

horizontally right next to each other, and the decision was made to base the

right-sided flap off of these 3 lateral row perforators.  The perforators were

circumferentially dissected as they exited through the fascia.  The fascia was then

opened superiorly and inferiorly, as well as the intervening fascia between the

perforators.  At this point, individual retrograde  dissections were

performed through the full thickness of the rectus abdominis muscle fibers.  Care was

taken to divide the muscle fibers longitudinally as much as possible.  A segment of

rectus abdominis muscle fibers did require division, given the horizontal orientation

of the dominant perforators.  Care was taken to individually identify,

circumferentially dissect, ligate and divide muscular side branches.  The perforators

were individually dissected down to their takeoffs from the inferior epigastric

pedicle.  The superior continuation of the pedicle was then ligated and divided.  A

more proximal pedicle dissection was then performed until adequate length and caliber

had been achieved, and the inferior epigastric artery as well as accompanying vena

comitans were individually isolated.  At this point the remainder of the lower

abdominal tissue was raised off of the anterior abdominal wall on the right side. 

The flap was then temporarily stapled in place.  A Doppler examination was then

performed and revealed a strong biphasic signal, and this was marked with a 5-0

Prolene suture, and a circular skin paddle was then designed and the remainder of the

flap de-epithelialized.  

 

At this point, attention was turned towards the left-sided lower abdominal flap,

which was raised in the exact same fashion as the right side, and therefore only 1

side will be dictated.  On the left-sided flap, there were again noted to be dominant

lateral row perforators and 3 of these were identified.  In a similar fashion as to

the right side, individual retrograde  dissections were performed through

the rectus abdominis muscle fibers.  The perforators were dissected down to their

takeoffs from the inferior epigastric pedicle, and the superior continuation was

ligated and divided.  In a similar fashion as to the right side, a proximal pedicle

dissection was performed, and therefore only 1 side will be dictated.  Once the

pedicle dissection was completed on the left side, the remainder of the lower

abdominal tissue was raised off the anterior abdominal wall.  The flap was

temporarily stapled in place.  A Doppler examination, skin paddle and

de-epithelialized were performed on the left-sided flap in the exact same fashion as

the right side, and therefore only 1 side will be dictated.  

 

Once bilateral lower abdominal flaps had been completely dissected, attention was

turned towards the intraoperative angiography.  The SPY system was brought onto the

field and was sterilely draped.  A 5-mL intravenous injection of indocyanine green

was given, and angiography was performed of bilateral mastectomy skin flaps as well

as bilateral lower abdominal flaps.  These bilateral angiography images were then

processed and relative perfusion data was used to assess the viability of the skin

flaps and lower abdominal flap.  There was noted to be adequate perfusion, and

therefore attention was turned towards harvesting of recipient vessels. 

 

Attention was first turned towards the left breast pocket, which was copiously

irrigated, and hemostasis was ensured.  Self-retaining retractors were then placed. 

There was noted to be a 2nd intercostal space perforating artery and vein, and

attention was first turned towards dissection of this.  Microvascular instruments and

techniques were used to isolate the perforating vein.  Once adequate length and

caliber had been achieved, an Acland clamp was placed proximally and the vein was

ligated and divided distally.  The vein was then flipped down onto the chest wall. 

The interspace between the 2nd and 3rd ribs was then identified.  The pectoralis

major muscle fibers were divided overlying this interspace, with care taken to

protect the perforating vein.  The pectoralis muscle fibers were then retracted, and

the periosteum and perichondrium on the superior surface of the 3rd rib were incised.

 A circumferential subperiosteal and subperichondrial dissection was then performed

around the left 3rd rib, and a large rongeur was used to perform partial resection of

the left 3rd rib at the costochondral junction.  On the deep surface, the periosteum

and perichondrium were carefully incised, and the underlying internal mammary artery

and accompanying medial vein were identified.  Using microvascular instruments and

techniques, a formal exploration of these vessels was performed and extensive

adventitectomies were performed along the artery and vein.  Once adequate exposure

and dissection of the vessels had been performed, attention was turned towards the

right breast pocket, where the exact same procedures were performed in order to

isolate a perforating vein and partially resect the right 3rd rib and explore the

internal mammary vessels, and therefore only 1 side will be dictated.

 

At this point, attention was turned towards the microvascular transfers.  The

right-sided lower abdominal flap was ligated and divided at the most proximal extent

of the pedicle dissection.  It was sterilely weighed and noted to weigh 1371 g.  It

was brought up to the left breast pocket, where it was temporarily stapled in place. 

The microvascular anastomoses were then performed.  The first venous anastomosis

between the antegrade stump of the internal mammary vein and the more lateral vein of

the inferior epigastric pedicle was performed using a 3.0-mm Synovis . 

Release of all clamps revealed good back flow across this anastomosis.  Next, a

second venous anastomosis between the retrograde stump of the internal mammary vein

and the more medial vein of the inferior epigastric system was performed using a

3.0-mm Synovis .  Release of all clamps revealed good flow across this

anastomosis as well. 

 

Next, attention was turned towards the arterial anastomosis.  A primary hand-sewn

anastomosis between the internal mammary artery and inferior epigastric artery was

performed using 8-0 nylon suture in a simple running fashion.  Release of all clamps

revealed good flow across this anastomosis and good perfusion to the deep inferior

epigastric  flap.  There was also noted to be a strong biphasic Doppler

signal on the skin paddle.  

 

At this point attention was turned towards the third venous anastomosis, which was

performed between the superficial inferior epigastric vein and the perforating vein

of the internal mammary system.  This was performed using a 2.5-mm Synovis ,

and release of all clamps revealed good flow across this venous anastomosis as well. 

At this point the flap was noted to be well-perfused, and therefore it was carefully

positioned into the left breast pocket.  Care was taken to ensure good lie of the

pedicle, without twisting or kinking.

 

The flap was then inset in multiple positions along the chest wall using 2-0 Vicryl

suture in a simple interrupted fashion.  On the flap was properly inset, Doppler

examination revealed a strong biphasic signal.  Therefore, two No. 19 round Zeb

drains were placed into the left breast pocket and axilla, and brought out through

separate stab incisions laterally and secured to the skin with 3-0 nylon sutures. 

The mastectomy flaps were then redraped.  The amount of skin paddle of the KADI flap

to be externalized was marked and redundant skin paddle was de-epithelialized.  The

skin paddle was then inset to the surrounding mastectomy flap skin edges using 3-0

Monocryl in a buried deep dermal fashion, followed by a 3-0 V-Loc barbed suture in a

running subcuticular fashion.  

 

Once the left breast closure was completed, attention was turned towards the

microvascular transfer of the right breast flap.  The left lower abdominal flap was

ligated and divided at the most proximal extent of the pedicle dissection.  It was

sterilely weighed and noted to weigh 1325 g.  It was brought up to the right breast

pocket, where it was temporarily stapled in place.  The microvascular anastomoses

were then performed.  A 3.0-mm Synovis  was used to anastomose the antegrade

stump of the internal mammary vein to the more lateral vein of the inferior

epigastric system.  Release of all clamps revealed good back flow across this

anastomosis.  Next, a second venous anastomosis between the retrograde stump of the

internal mammary vein and the more medial vein of the inferior epigastric system was

performed using a 2.5-mm Synovis .  Release of all clamps revealed good flow

across this anastomosis as well. 

 

Next, the arterial anastomosis was performed between the internal mammary artery and

inferior epigastric artery using an 8-0 nylon suture in a simple running fashion. 

Release of all clamps revealed good flow across this anastomosis and good perfusion

to the microvascular free flap with a strong biphasic Doppler signal in the skin

paddle. 

 

Finally, a third venous anastomosis was performed on the right side between the

superficial inferior epigastric vein and a perforating vein from the internal mammary

system using a 3.0-mm Synovis .  Release of all clamps revealed good flow

across this third venous anastomosis on the right side.  The flap was then inset and

the right breast closed in a similar fashion as to the left side, and therefore only

1 side will be dictated.  Upon completion of the completion of the bilateral breast

closures, there were noted to be strong biphasic Doppler signals.  

 

Concurrently closure of the abdomen was performed.  The fascial incisions bilaterally

were closed in a primary fashion using a 0 V-Loc barbed suture in simple running

fashion.  Once the fascial closure was completed, the ultrasound system was brought

into the field and was sterilely draped.  A local anesthetic mixture of 20 mL of

Exparel, 30 mL of 0.25% Marcaine and 80 mL of normal saline was used to performed

bilateral transversus abdominis plane regional nerve blocks.  A total of 30 mL of

this local anesthetic mixture was injected into each transversus abdominis plane,

again under ultrasound guidance.  Once the regional nerve blocks were completed, the

patient was placed into a flexed position.  Two No. 19 round Zeb drains were placed

in the abdominal pocket and brought out through separate stab incisions laterally and

secured to the skin with 3-0 nylon sutures.  The superior abdominal skin flap was

then advanced and closed in layers using 0 Vicryl in an interrupted buried fashion in

Kiersten's layer, followed by a 3-0 Monocryl in a buried deep dermal fashion, and

finally 3-0 V-Loc suture in a running subcuticular fashion.  The site of the

umbilicus transposition had been marked and a vertically-oriented ellipse with a core

of fat was excised and discarded.  The umbilicus was transposed and inset using a 3-0

PDS in a buried deep dermal fashion, followed by 5-0 nylon in a simple running

fashion.

 

Once all incisions were closed, Prineo tape was applied to the lower abdominal

closure line.  Xeroform was applied to the umbilicus, and Dermabond was applied to

bilateral breast incisions.  All drain exit sites were then dressed with Biopatch and

Tegaderm.  The patient was then placed into a soft surgical bra for inferior and

lateral pole support, and a window was cut out for proper flap monitoring.  

 

The patient was then slowly awakened and extubated without incident, and was

transported to the recovery room in stable condition. 

 

Doppler examination upon entering Recovery revealed strong biphasic Doppler signals. 

 

 

TAWANA [id number]

DD: 06/29/2019 15:25

DT: 06/29/2019 20:33

Job #:  [id number]

## 2019-06-29 NOTE — DS
Physical Examination


Vital Signs: 


 Vital Signs











Temperature  98.7 F   06/29/19 06:00


 


Pulse Rate  75   06/29/19 06:00


 


Respiratory Rate  25 H  06/29/19 06:00


 


Blood Pressure  123/78   06/29/19 06:00


 


O2 Sat by Pulse Oximetry (%)  98   06/29/19 09:00











Constitutional: Yes: Well Nourished, No Distress, Calm


Eyes: Yes: WNL


HENT: Yes: WNL


Neck: Yes: WNL


Cardiovascular: Yes: Regular Rate and Rhythm


Respiratory: Yes: Regular, CTA Bilaterally


Gastrointestinal: Yes: Normal Bowel Sounds, Soft


...Rectal Exam: Yes: Deferred


Renal/: Yes: WNL


Breast(s): Yes: Other (Mastectomy wounds clean, dry, and intact.  Drains 

functioning well.  KADI flaps with good dopplerable signals.  Skin flap with 

some ischemia on right lower flap.)


Musculoskeletal: Yes: WNL


Extremities: Yes: WNL


Wound/Incision: Yes: Clean/Dry, Well Approximated


Neurological: Yes: Alert, Oriented


Psychiatric: Yes: WNL


Labs: 


 CBC, BMP





 06/28/19 07:24 





 06/28/19 07:24 











Discharge Summary


Reason For Visit: LT BREAST CA





Left breast cancer muticentric in UOQ and UIQ.


Procedures: Principal: Bilateral total mastectomies with left sentinel lymph 

node biopsy and axillary lymph node dissection with bilateral KADI flap 

reconstructions


Hospital Course: 





The patient did well postoperatively and was monitored in the ICU 

postoperatively for frequent flap checks and wound management.  She did well 

and was OOB on POD#1.  She had good pain control on tramadol and her KADI flaps 

were viable with good dopplerable signals.  She was felt stable for discharge 

on POD#3. 


Condition: Good





- Instructions


Diet, Activity, Other Instructions: 


Post Operative Instructions - [facility]





We hope your recovery will be uneventful. For those of you who have been given


general anesthesia, there is a possibility you might have some lightheadedness


and possibly nausea.





It is important that each patient, especially those who have had general 


anesthesia, follow these instructions, please:





1. Do NOT operate a motor vehicle for 24 hours.


2. Do NOT drink any alcoholic beverages for 24 hours.


3. Do NOT take any sedatives, narcotics, or tranquilizers for 24 hours


    unless specifically ordered by your surgeon.


4. Do NOT undertake any strenuous exercise or outside activity for 24 hours 


    unless specifically permitted by your surgeon.


5. Eat light foods that are easy to digest. If you have any problems with nausea


   and vomiting, lie down and rest. If it continues, call your surgeon.


6. Call your surgeon AT ONCE if you have problems with:


   a. Bleeding


   b. Urinating


   c. Excessive pain or drainage


   d. Numbness


      


If any problems occur, call your physician first. If you cannot reach him/her, 

call


the Ambulatory Surgery Unit at [phone], or the Emergency Room at [phone].


Follow up with Joel Berger / [facility] in 7 days.





Medication: Vicodin E-S OR Percocet 1-2 tablets every 4-6 hrs as needed for 5-7 

days.





Wound Care: Keep wound dry and clean for 48 hours. You may remove the dressing 

after 


                    48 hours and may shower. Keep steri-strips in place until 

follow-up appointment


                    No heavy lifting or strenuous activities.


Post Operative Instructions - [facility]





We hope your recovery will be uneventful. For those of you who have been given


general anesthesia, there is a possibility you might have some lightheadedness


and possibly nausea.





It is important that each patient, especially those who have had general 


anesthesia, follow these instructions, please:





1. Do NOT operate a motor vehicle for 24 hours.


2. Do NOT drink any alcoholic beverages for 24 hours.


3. Do NOT take any sedatives, narcotics, or tranquilizers for 24 hours


    unless specifically ordered by your surgeon.


4. Do NOT undertake any strenuous exercise or outside activity for 24 hours 


    unless specifically permitted by your surgeon.


5. Eat light foods that are easy to digest. If you have any problems with nausea


   and vomiting, lie down and rest. If it continues, call your surgeon.


6. Call your surgeon AT ONCE if you have problems with:


   a. Bleeding


   b. Urinating


   c. Excessive pain or drainage


   d. Numbness


      


If any problems occur, call your physician first. If you cannot reach him/her, 

call


the Ambulatory Surgery Unit at [phone], or the Emergency Room at [phone].


Follow up with Joel Berger / [facility] in 7 days.





Medication: Vicodin E-S OR Percocet 1-2 tablets every 4-6 hrs as needed for 5-7 

days.





Wound Care: Keep wound dry and clean for 48 hours. You may remove the dressing 

after 


                    48 hours and may shower. Keep steri-strips in place until 

follow-up appointment


                    No heavy lifting or strenuous activities. NO shower, wear 

bra, empty and milk and  record STANLEY output twice daily, ASA 81 mg daily


 WALK in flexed position at all times





Referrals: 


Josafat Berger MD [Staff Physician] - 


Ulices Salcido MD [Staff Physician] - 


Disposition: HOME





- Home Medications


Comprehensive Discharge Medication List: 


Ambulatory Orders





Aspirin [ASA -] 81 mg PO DAILY #20 tab.chew 06/28/19 


Cefadroxil 500 mg PO BID #20 capsule 06/28/19 


Oxycodone HCl/Acetaminophen [Percocet 5-325 mg Tablet] 1 - 2 tab PO Q6H PRN #30 

tab MDD 6 06/28/19 


Diazepam [Valium] 5 mg PO Q8H PRN #20 tablet MDD 2 06/29/19 


Tramadol HCl 50 mg PO Q4HWA PRN #30 tablet MDD 6 06/29/19

## 2019-06-30 VITALS — SYSTOLIC BLOOD PRESSURE: 125 MMHG | DIASTOLIC BLOOD PRESSURE: 92 MMHG

## 2019-06-30 VITALS — HEART RATE: 77 BPM | TEMPERATURE: 98 F

## 2019-06-30 RX ADMIN — ASPIRIN 325 MG ORAL TABLET SCH MG: 325 PILL ORAL at 09:03

## 2019-06-30 RX ADMIN — ENOXAPARIN SODIUM SCH MG: 30 INJECTION SUBCUTANEOUS at 09:03

## 2019-06-30 RX ADMIN — DOCUSATE SODIUM SCH MG: 100 CAPSULE, LIQUID FILLED ORAL at 09:03

## 2019-06-30 RX ADMIN — ACETAMINOPHEN PRN MG: 500 TABLET, FILM COATED ORAL at 05:30

## 2019-06-30 RX ADMIN — ACETAMINOPHEN PRN MG: 500 TABLET, FILM COATED ORAL at 12:21

## 2019-06-30 NOTE — PN
Progress Note, Physician


Chief Complaint: 





Left breast cancer multicentric s/p bilateral total mastectomies with left 

axillary sentinel node biopsy and axillary lymph node dissection with bilateral 

KADI flap reconstruction


History of Present Illness: 





The patient was diagnosed with left breast multicentric breast cancer in the 

UOQ and UIQ after screening mammography and ultrasound and required a left 

breast mastectomy and decided on bilateral mastectomies which was performed 

with bilateral KADI reconstructions on June 27. She was admitted 

postoperatively for post op pain and wound management. 





- Current Medication List


Current Medications: 


Active Medications





Acetaminophen (Tylenol -)  1,000 mg PO Q6H PRN


   PRN Reason: PAIN OR FEVER


   Last Admin: 06/30/19 05:30 Dose:  1,000 mg


Aspirin (Asa -)  325 mg PO DAILY [facility]


   Last Admin: 06/30/19 09:03 Dose:  325 mg


Diazepam (Valium -)  5 mg PO Q8H PRN


   PRN Reason: MUSCLE SPASMS


   Last Admin: 06/30/19 09:04 Dose:  5 mg


Docusate Sodium (Colace -)  100 mg PO BID [facility]


   Last Admin: 06/30/19 09:03 Dose:  100 mg


Enoxaparin Sodium (Lovenox -)  30 mg SQ BID [facility]


   Last Admin: 06/30/19 09:03 Dose:  30 mg


Ondansetron HCl (Zofran Injection)  4 mg IVPUSH Q6H PRN


   PRN Reason: NAUSEA AND/OR VOMITING


Ranitidine HCl (Zantac -)  150 mg PO DAILY PRN


   PRN Reason: INDIGESTION


   Last Admin: 06/28/19 18:37 Dose:  150 mg


Tramadol HCl (Ultram -)  50 mg PO Q4H PRN


   PRN Reason: PAIN LEVEL 6-10


   Last Admin: 06/30/19 05:29 Dose:  50 mg











- Objective


Vital Signs: 


 Vital Signs











Temperature  98.0 F   06/30/19 10:00


 


Pulse Rate  77   06/30/19 10:00


 


Respiratory Rate  24 H  06/30/19 10:00


 


Blood Pressure  128/92   06/30/19 10:00


 


O2 Sat by Pulse Oximetry (%)  99   06/30/19 08:00











Constitutional: Yes: Well Nourished, No Distress, Calm


Eyes: Yes: WNL


HENT: Yes: WNL


Neck: Yes: Tenderness


Cardiovascular: Yes: Regular Rate and Rhythm


Respiratory: Yes: Regular, CTA Bilaterally


Gastrointestinal: Yes: WNL


...Rectal Exam: Yes: Deferred


Genitourinary: Yes: WNL


Breast(s): Yes: Other (Mastectomy wounds clean, dry, and intact.  KADI flaps 

with excellent doppler signals.  Skin flaps with some epidermolysis/ischemia on 

lower right flap and medial left flap.  Drains functioning well.)


Musculoskeletal: Yes: WNL


Extremities: Yes: WNL


Integumentary: Yes: WNL


Wound/Incision: Yes: Clean/Dry, Dressing Dry and Intact


Neurological: Yes: Alert, Oriented


...Motor Strength: WNL


Psychiatric: Yes: WNL


Labs: 


 CBC, BMP





 06/28/19 07:24 





 06/28/19 07:24 











Problem List





- Problems


(1) Breast cancer, left breast


Assessment/Plan: 


She is doing well POD#3 s/p bilateral total mastectomies with left axillary 

lymph node dissection and bilateral KADI flap reconstruction.  Incision wounds 

clean, dry, and intact.  Drains functioning well.  KADI flaps warm viable with 

excellent dopplerable signals.  Skin flaps viable but some ischemia on lower 

right native skin flap and medial left native flap.  Good pain control now on 

tramadol since did not tolerate oxycontin.  She is ambulating well and 

tolerating diet.  STANLEY drain teaching.  Stable for discharge today.  Home on 

tramadol for pain and cefadroxil antibiotics.  Follow-up with Joel Salcido and 

[facility] in 1 week.  Record drain outputs daily.  VNS set up for discharge.  No 

heavy lifting or exercise.  No bath/shower until drains removed.  Keep 

compressive bra on day/night.


Code(s): C50.912 - MALIGNANT NEOPLASM OF UNSPECIFIED SITE OF LEFT FEMALE BREAST

   


Qualifiers: 


   Breast location: overlapping sites of breast   Estrogen receptor status: 

positive   Patient sex: female   Qualified Code(s): C50.812 - Malignant 

neoplasm of overlapping sites of left female breast; Z17.0 - Estrogen receptor 

positive status [ER+]

## 2019-07-01 NOTE — SURG
Surgery First Assist Note


First Assist: Robinson Franco PA-C


Date of Service: 06/27/19


Diagnosis: 





1. Left breast Cancer


2. Acquired abscence of bilateral breasts and nipples


Procedure: 





1. Immediate left breast reconstruction with deep inferior epigastric 

 microvascular free flap


2. Immediate right breast reconstruction with deep inferior epigastric 

 microvascular free flap


3. Bilateral partial 3rd rib resection


4. Bilateral exploration of internal mammry vessels with extensive 

adeventitectomies


5. Bilateral mastectomy skin flap and lower abdominal flap intraperative 

angiography using indocyanine green


6. Bilateral ultrasound guided  transversus abdominus plane regional blocks


I was present for the entirety of the operative procedure. For further detail, 

please refer to operative report.








Visit type





- Case Type


Case Type: Scheduled

## 2019-07-02 ENCOUNTER — FORM ENCOUNTER (OUTPATIENT)
Age: 54
End: 2019-07-02

## 2019-07-03 NOTE — PATH
Surgical Pathology Report



Patient Name:  REKHA RIVERA

Accession #:  [id number]

Med. Rec. #:  [id number]                                                        

   /Age/Gender:  1965 (Age: 53) / F

Account:  [id number]                                                          

             Location: [facility] 

Taken:  2019

Received:  2019

Reported:  7/3/2019

Physicians:  Josafat Berger M.D.

  



Specimen(s) Received

A: LEFT AXILLARY SENTINEL LYMPH NODE #1 

B: LEFT AXILLARY SENTINEL LYMPH NODE #2 

C: LEFT AXILLARY SENTINEL LYMPH NODE #3 

D: LEFT BREAST AND AXILLARY CONTENTS, MASTECTOMY 

E: LEFT AXILLARY CONTENTS 

F: ANTERIOR MARGIN, UPPER INNER 

G: ANTERIOR MARGIN, UPPER OUTER 

H: RIGHT BREAST, MASTECTOMY 





Clinical History

Left breast cancer, upper inner quadrant and upper outer quadrant, multifocal





Intraoperative Consult Diagnosis

A. Left axillary sentinel lymph node, frozen section: One lymph node, positive

for metastatic carcinoma.



B. Left axillary sentinel lymph node #2, frozen section: One lymph node,

negative for metastatic carcinoma.



C. Left axillary sentinel lymph node #3, frozen section: One lymph node,

negative for metastatic carcinoma.



ANNA Cleaning M.D., 2019









Final Diagnosis

A. AXILLARY SENTINEL LYMPH NODE #1, LEFT, EXCISION (FS):

ONE LYMPH NODE WITH METASTATIC CARCINOMA ON H&E AND CONFIRMED BY AE1/3

IMMUNOHISTOCHEMICAL STAIN (1/1).

TUMOR DEPOSIT MEASURES 5 MM IN GREATEST MICROSCOPIC DIMENSION.

EXTRANODAL EXTENSION IDENTIFIED.

SEE COMMENT.



B. AXILLARY SENTINEL LYMPH NODE #2, LEFT, EXCISION (FS):

ONE LYMPH NODE NEGATIVE FOR CARCINOMA (0/1).



C. AXILLARY SENTINEL LYMPH NODE #3, LEFT, EXCISION (FS):

ONE LYMPH NODE NEGATIVE FOR CARCINOMA (0/1).



D. BREAST AND AXILLARY CONTENTS, LEFT, MASTECTOMY:

MULTIFOCAL, MULTICENTRIC, INVASIVE CARCINOMA (3 FOCI), RANGING IN SIZE FROM

1.31.8 CM IN GREATEST MICROSCOPIC DIMENSION.



TWO FOCI OF INVASIVE LOBULAR CARCINOMA, CLASSIC AND PLEOMORPHIC TYPES, NUCLEAR

GRADE 2-3 (TUBULE SCORE: 3/3, NUCLEAR GRADE: 2-3/3, MITOTIC SCORE: 1/3; TOTAL

SYLVESTER SCORE: 6-7/9), PRESENT AT UPPER OUTER QUADRANT (UOQ).

TUMOR FOCI MEASURES 1.3 AND 1.5 CM IN GREATEST MICROSCOPIC DIMENSION.

SMALLER FOCI WITH ASSOCIATED PROMINENT LYMPHOCYTIC INFILTRATE. 

TUMOR INFILTRATING, LYMPHOCYTES (TILs): >60%.



ONE FOCUS OF INVASIVE CARCINOMA WITH MIXED DUCTAL AND LOBULAR FEATURES,

MODERATELY DIFFERENTIATED, NUCLEAR GRADE 2-3, (TUBULE SCORE: 3/3, NUCLEAR GRADE:

2-3/3, MITOTIC SCORE: 1/3; TOTAL SYLVESTER SCORE: 6-7/9), PRESENT AT UPPER

INNER QUADRANT (UIQ).

TUMOR FOCUS MEASURES 1.8 CM IN GREATEST MICROSCOPIC DIMENSION.



FOCAL DUCTAL CARCINOMA IN SITU (DCIS), CRIBRIFORM TYPE, INTERMEDIATE NUCLEAR

GRADE, PRESENT AT UPPER INNER QUADRANT (UIQ).

EXTENSIVE LOBULAR CARCINOMA IN SITU (LCIS), CLASSIC AND PLEOMORPHIC TYPES.

NO DEFINITIVE LYMPHOVASCULAR INVASION IDENTIFIED.

SURGICAL MARGINS ARE UNINVOLVED BY CARCINOMA; CARCINOMA IS 5 MM FROM CLOSEST

ANTERIOR SOFT TISSUE MARGIN (GROSS MEASUREMENT), SEE SPECIMEN F-G FOR FINAL

MARGINS.

FOURTEEN LYMPH NODES NEGATIVE FOR CARCINOMA (0/14).

SKIN AND NIPPLE PRESENT AND UNINVOLVED BY CARCINOMA.

REMAINDER OF BREAST PARENCHYMA SHOWS ATYPICAL DUCTAL AND LOBULAR HYPERPLASIA IN

A BACKGROUND OF PROLIFERATIVE FIBROCYSTIC CHANGES INCLUDING STROMAL FIBROSIS,

MICROCYSTS, COLUMNAR CELL CHANGES, SCLEROSING ADENOSIS, APOCRINE METAPLASIA, AND

ASSOCIATED MICROCALCIFICATIONS.

PATHOLOGIC STAGE (pTNM): pT1c(m) pN1a.

SEE INVASIVE CARCINOMA CASE SUMMARY BELOW.

SEE COMMENT.



E. AXILLARY CONTENTS, LEFT, DISSECTION:

TWO LYMPH NODES, NEGATIVE FOR CARCINOMA (0/2).



F. ANTERIOR MARGIN, UPPER INNER, EXCISION:

BENIGN FIBROADIPOSE TISSUE.



G. ANTERIOR MARGIN, UPPER OUTER, EXCISION:

BENIGN FIBROADIPOSE TISSUE.



H. BREAST, RIGHT, MASTECTOMY:

LOBULAR CARCINOMA IN SITU (LCIS), CLASSIC TYPE, INVOLVING ADENOSIS PRESENT AT

LOWER OUTER QUADRANT (LOQ).

REMAINDER OF BREAST PARENCHYMA SHOWS ATYPICAL LOBULAR HYPERPLASIA AND SMALL

FIBROADENOMAS IN A BACKGROUND OF PROLIFERATIVE FIBROCYSTIC CHANGES INCLUDING

STROMAL FIBROSIS, MICROCYSTS, COLUMNAR CELL CHANGES, SCLEROSING ADENOSIS,

APOCRINE METAPLASIA, AND ASSOCIATED MICROCALCIFICATIONS,.

FOUR LYMPH NODES NEGATIVE FOR CARCINOMA (0/4).

SEE COMMENT.



Comment: Part A, Immunohistochemical stain performed and interpreted at [facility] show E-Cadherin is positive in metastatic focus.



Part D, Immunohistochemical stain for AE1/3 highlights the tumor foci. In blocks

D4 and D6, (2 foci, UOQ) E-Cadherin is negative, while p120 show cytoplasmic

staining, supportive of lobular phenotype. In block D9 (UIQ), E-cadherin stain

shows membranous staining in a subset of cells. P120 shows both cytoplasmic

staining and membranous (subset) staining; supportive of mixed ductal and

lobular features.



Myoepithelial cell markers (p63 and SMM-HC, Block D4) are lost in the areas of

invasive carcinoma; while present in LCIS.



Part H, Immunohistochemical stain for E-Cadherin is negative, supportive of

lobular phenotype. Myoepithelial cell markers (p63 and SMM-HC) are lost in the

areas of invasive carcinoma; while present in LCIS.



Immunohistochemical stains performed and interpreted at [facility]:

Part A: AE1/3, E-cadherin

Part D: p63 and SMM-HC

Part H: E-cadherin, p63 and SMM-HC



Immunohistochemical stains performed at [place], [place]

([id number]) and interpreted at [facility]:

Part D: AE1/3, E-cadherin, p120

 



Comments

Breast Invasive Carcinoma: Surgical Pathology Case Summary (PART D)

(Based on AJCC TNM 8 th edition) 



Procedure 

_X_ Total mastectomy (including nipple-sparing and skin-sparing mastectomy)



Specimen Laterality

_X_ Left



Tumor Size 

_X_ Greatest dimension of largest invasive focus >1 mm (millimeters): 18 mm     





Histologic Type

_X_ Invasive lobular carcinoma (2 Foci, UOQ)

_X_ Invasive carcinoma with ductal and lobular features ("mixed type carcinoma")

(1 Foci, UIQ).



Histologic Grade ([place] Histologic Score) 



Glandular (Acinar)/Tubular Differentiation

_X_ Score 3 (<10% of tumor area forming glandular/tubular structures)



Nuclear Pleomorphism

     _X_ Score 2 

_X_ Score 3



Mitotic Rate

_X_ Score 1 



Overall Grade

_X_ Grade 2 (scores of 6 or 7) 



Tumor Focality 

_X_ Multiple foci of invasive carcinoma

      Number of foci: 3

      Sizes of individual foci: 1.3 (UOQ), 1.5 (UOQ), 1.8 (UIQ)



Ductal Carcinoma In Situ (DCIS) 

_X_ Negative for extensive intraductal component (EIC) 



Margins 

Invasive Carcinoma Margins 

_X_ Uninvolved by invasive carcinoma

     Distance from closest margin (millimeters): 5 mm in mastectomy (gross

measurement, D), additional margins 

     are negative (F & G).

     Closest margin: anterior soft tissue

     

DCIS Margins 

_X_ Uninvolved by DCIS

     

Regional Lymph Nodes 

_X_ Involved by tumor cells

            Number of Lymph Nodes with Macrometastases (>2 mm): 1

            Number of Lymph Nodes with Micrometastases (>0.2 mm to 2 mm and/or

>200 cells): 0

            Number of Lymph Nodes with Isolated Tumor Cells (=0.2 mm and =200

cells): 0

            Size of Largest Metastatic Deposit (millimeters): 5 mm



Extranodal Extension:

_X_ Present



Number of Lymph Nodes Examined: 19

Number of Curtiss Nodes Examined: 3



Treatment Effect 

_X_ No known presurgical therapy



 Lymphovascular Invasion 

 _X_ Not identified



Pathologic Stage Classification (pTNM, AJCC 8th Edition) 



Primary Tumor (Invasive Carcinoma) (pT) 

     _X_ pT1c:     Tumor >10 mm but =20 mm in greatest dimension



Category (pN)

     _X_ pN1a:     Metastases in 1 to 3 axillary lymph nodes, at least 1

metastasis larger than 2.0 mm



     Biomarker Studies 

Results of ER and MI studies performed on this specimen (block#D9) at [facility] are as follows:

ER (clone [id number] mouse monoclonal antibody by [employer]): 90% nuclear staining with

strong intensity (Positive).

MI (clone16 mouse monoclonal antibody by [employer]): 70% nuclear staining with

moderate to strong intensity (Positive).



Results of Her2 (IHC) & Ki-67 studies performed on this specimen (block# D9)

at [place], [place] ([id number]) are as follows:

Her2 IHC (EP3 from [employer], formerly known as [id number], using Bond Polymer Refine

detection kit): 1+ (Negative).

Ki-67: ~10% (Low proliferative index).



Positive and negative controls (internal if applicable) show appropriate

results.

Formalin fixation and cold ischemic times are within current ASCO/CAP

recommendations for ER, MI and Her2 testing.



***Electronically Signed***

Adia Martinez M.D.



Amendments

Amended:  7/3/2019      Previous Signout Date:  7/3/2019

Comment:     Part H, Diagnosis amended to delete Pleomorphic type.





Gross Description

A. Received fresh labeled "left axillary sentinel lymph node," is a 1.3 x 1.2 x

0.6 cm lymph node with attached fat. The specimen is bisected and entirely

submitted for frozen section. The frozen section residue is entirely submitted

in one cassette.



B. Received fresh labeled "left axillary sentinel lymph node #2," is a 1.0 x 0.5

x 0.3 cm lymph node with attached fat. The specimen is submitted in toto for

frozen section. The frozen section residue is entirely submitted in one

cassette.



C. Received in formalin labeled "left axillary sentinel node #3," is a 0.7 x 0.5

x 0.3 cm lymph node with attached fat. The specimen is submitted in toto for

frozen section. The frozen section residue is entirely submitted in one

cassette.



D. Received in formalin, labeled "left breast with axillary contents," is a 1209

gram, 27.0 x 21.0 x 6.0 cm. left mastectomy specimen with a short suture marking

the superior aspect and a long suture marking the lateral aspect of the

specimen, per the surgeon. There is an 8.5 x 7.0 x 2.0 cm portion of axillary

fat attached at the lateral aspect of the specimen. The anterior surface

displays a 6.5 x 4.3 cm tan, irregular portion of skin with a 1.1 cm in diameter

nipple. The deep margin is inked black and the anterior soft tissue margin is

inked blue. The specimen is serially sectioned from medial to lateral.

Sectioning reveals a 1.3 x 1.3 x 1.0 cm tan, indurated mass in the upper outer

quadrant (UOQ). The mass is 0.9 cm from the anterior soft tissue margin. There

is a metallic biopsy clip identified within the mass. There is a second 1.3 x

0.7 x 0.6 cm, firm nodular mass, also in the UOQ, 4 cm lateral to the first

mass. The second mass is 2 cm from the deep margin. There is a third mass

identified in the upper inner quadrant (UIQ). The third mass measures 1.8 x 0.8

x 0.8 cm and is 0.5 cm from the anterior soft tissue margin. The remaining

breast parenchyma displays multifocal dense white fibrous tissue. Sectioning of

the axillary fat reveals multiple lymph nodes measuring up to 2.0 cm in greatest

dimension. Representative sections are submitted in 31 cassettes as follows:

1-serially sectioned nipple; 2-subareolar shave; 3-full face section of first,

larger UOQ mass with anterior soft tissue margin; 4-additional section of first,

larger UOQ mass; 5-full face section of second, smaller UOQ mass; 6-additional

section of second, smaller UOQ mass; 1-6-ffykfgwtfx UOQ tissue; 9-full face

section of UIQ mass with anterior soft tissue margin; 10-11-additional sections

of UIQ mass with anterior soft tissue margin; 12-uninvolved UIQ tissue;

13-14-lower outer quadrant; 15-16-lower inner quadrant; 17-deep margin; 18-skin;

19-21-one trisected lymph node; 22-28-one bisected lymph node each; 29-31-two

whole lymph nodes each.



E. Received in formalin labeled "left axillary contents," is a 6.5 x 6.0 x 1.8

cm aggregate of yellow, lobulated adipose tissue. Sectioning reveals 2 lymph

nodes measuring 0.8 and 1.9 cm in greatest dimension. The lymph nodes are

entirely submitted in 3 cassettes as follows: 1-one whole lymph node; 2-3-one

bisected lymph node.



F. Received in formalin labeled "anterior margin upper inner," is a 4.7 x 3.5 x

1.3 cm portion of fibroadipose tissue with a suture marking the biopsy cavity

side, per the surgeon. The new margin is inked blue and the specimen is serially

sectioned. The specimen is entirely and sequentially submitted in 7 cassettes.



G. Received in formalin labeled "anterior margin upper outer," is a 6.4 x 4.3 x

1.5 cm portion of fibroadipose tissue with a suture marking the biopsy cavity

side, per the surgeon. The new margin is inked blue and the specimen is serially

sectioned. Representative sections are sequentially submitted in 10 cassettes.



H. Received in formalin, labeled "right breast mastectomy," is a 1099 gram, 21.0

x 20.0 x 6.0 cm. right mastectomy specimen with a short suture marking the

superior aspect and a long suture marking the lateral aspect of the specimen,

per the surgeon. The anterior surface displays a 7.3 x 3.5 cm tan, irregular

portion of skin with a 1.1 cm in diameter nipple. The deep margin is inked black

and the anterior soft tissue margin is inked blue. The specimen is serially

sectioned from lateral to medial. Sectioning reveals abundant dense, white,

focally firm fibrous tissue. No definitive mass is identified. There are 4 lymph

nodes identified at the lateral aspect of the specimen, measuring up to 0.8 cm

in greatest dimension. Representative sections are submitted in 18 cassettes as

follows: 1-serially sectioned nipple; 2-subareolar shave; 3-5-upper outer

quadrant; 6-7-lower outer quadrant; 8-10-upper inner quadrant; 11-12-lower inner

quadrant; 13-skin and anterior soft tissue margin; 14-deep margin; 15-16-one

trisected lymph node each; 17-18-one bisected lymph node each.



Total formalin fixation time: Approximately 24 hours

## 2019-07-09 ENCOUNTER — FORM ENCOUNTER (OUTPATIENT)
Age: 54
End: 2019-07-09

## 2019-07-16 ENCOUNTER — FORM ENCOUNTER (OUTPATIENT)
Age: 54
End: 2019-07-16

## 2019-08-06 ENCOUNTER — FORM ENCOUNTER (OUTPATIENT)
Age: 54
End: 2019-08-06

## 2019-08-13 ENCOUNTER — FORM ENCOUNTER (OUTPATIENT)
Age: 54
End: 2019-08-13

## 2019-08-15 ENCOUNTER — HOSPITAL ENCOUNTER (OUTPATIENT)
Dept: HOSPITAL 74 - FASU | Age: 54
Discharge: HOME | End: 2019-08-15
Attending: SURGERY
Payer: COMMERCIAL

## 2019-08-15 VITALS — HEART RATE: 78 BPM | SYSTOLIC BLOOD PRESSURE: 136 MMHG | DIASTOLIC BLOOD PRESSURE: 75 MMHG

## 2019-08-15 VITALS — BODY MASS INDEX: 46.2 KG/M2

## 2019-08-15 VITALS — TEMPERATURE: 97.8 F

## 2019-08-15 DIAGNOSIS — N64.89: ICD-10-CM

## 2019-08-15 DIAGNOSIS — N64.1: Primary | ICD-10-CM
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
## 2019-08-15 DIAGNOSIS — L76.82: ICD-10-CM

## 2019-08-15 PROCEDURE — 0HBT0ZZ EXCISION OF RIGHT BREAST, OPEN APPROACH: ICD-10-PCS | Performed by: SURGERY

## 2019-08-15 PROCEDURE — 0JX60ZB TRANSFER CHEST SUBCUTANEOUS TISSUE AND FASCIA WITH SKIN AND SUBCUTANEOUS TISSUE, OPEN APPROACH: ICD-10-PCS | Performed by: SURGERY

## 2019-08-15 PROCEDURE — 0JB80ZZ EXCISION OF ABDOMEN SUBCUTANEOUS TISSUE AND FASCIA, OPEN APPROACH: ICD-10-PCS | Performed by: SURGERY

## 2019-08-15 NOTE — OP
Operative Note





- Note:


Operative Date: 08/15/19


Pre-Operative Diagnosis: Necrotic tissue of right breast and abdomen


Operation: 1. Debridement and pulse lavage irrigation of breast wound and lower 

abdominal wound status post bilateral mastectomy with flap reconstruction.  2. 

Advancement closure breast, right breast wound and abdominal wound.


Findings: 





as dictated


Post-Operative Diagnosis: Same as Pre-op


Surgeon: Ulices Salcido


Assistant: Jessica Sullivan


Anesthesia: General


Specimens Removed: cultures sent-abdomen wound, skin sent-right breast


Estimated Blood Loss (mls): 10 (ml)


Drains & Tubes with Location: tami left in place, abdomen


Fluid Volume Replaced (mls): 800 (ml LR)


Operative Report Dictated: Yes

## 2019-08-15 NOTE — OP
DATE OF OPERATION:  08/15/2019

 

SURGEON:  YANNI Salcido MD

 

ASSISTANT:  Jessica Sullivan PA-C 

 

PREOPERATIVE DIAGNOSIS:  Necrotic tissue of right breast and abdomen.

 

POSTOPERATIVE DIAGNOSIS:  Necrotic tissue of right breast and abdomen.

 

OPERATIVE PROCEDURE:  

1.  Debridement and pulse lavage irrigation of breast wound and lower abdominal wound

status post bilateral mastectomy with flap reconstruction.  

2.  Advancement closure breast, right breast wound and abdominal wound.

 

OPERATIVE INDICATIONS:  Patient is a 53-year-old white female who was brought to the

operating room previously for bilateral mastectomy reconstruction with deep flap. 

The patient presented in the office with evidence of breakdown of her mastectomy skin

on the right breast as well as the lower abdominal incision from deep flap which

 and had opening down to the fascia with draining seromatous fluid.  There

was no evidence of infection, but the patient just started chemotherapy for her

breast cancer treatment.  The patient was brought to the operating room on an urgent

basis in order to close these open wounds with debridement and closure.  The risks

and benefits of surgical versus nonsurgical alternatives as well as the material

complications of the procedure were described in detail previously in the office as

well as today in the holding area where she was marked for the procedure and

discussion.  

 

OPERATIVE PROCEDURE IN DETAIL:  The patient was taken to the operating room, and

after induction of general anesthesia in the supine position, the areas on the

breasts on the lower portion of the skin flap on the right breast as well as the area

around the umbilicus and two areas on the lower abdomen measuring approximately 20 cm

in greatest dimension were opened with the larger one being down to the underlying

rectus abdominis fascia.  After induction of general anesthesia, attention was turned

to the wounds.  The wounds were sharply debrided on their edges with the scalpel down

through the skin to the subcutaneous tissue on the breast and then separately on the

abdomen with good blood flow seen at the edges of the wound margins.  Copious

irrigation of the wounds were carried out using 6 L of saline with a pulse lavage

 especially in the abdominal area where there was a large cavity seen from

the right lateral flank to the midline and beyond on the left side.  This was

curetted using sharp curettes and then copiously irrigated with 6 L of saline

containing bacitracin.  Once this was accomplished, a 15 Zeb round drain was

brought out through a separate stab wound inferiorly away from the incisions, and

then advancement closures were carried out.  Separate closures were carried out on

the breast as well as the abdomen.  The breast was closed with half-buried mattress

sutures in interrupted fashion along the breast advancing the lower pole of the

breast upwards and closing the skin and subcutaneous tissue.  The abdomen was also

closed in multiple layers using 2-0 Vicryl sutures on the deep tissue over Scarpas

fascia and a secondary layer of interrupted vertical mattress running sutures with

3-0 nylon on both wounds.  Sterile dressings using Bacitracin on the breast, Xeroform

on the abdomen, and fluff dressings with a surgi-bra and an abdominal binder were

placed.  The patient tolerated the procedure well.  She was awakened and transferred

to the recovery room in satisfactory condition.

 

 

SCOTTY SALCIDO M.D.

 

[id number]

DD: 08/15/2019 09:25

DT: 08/15/2019 10:14

Job #:  [id number]

## 2019-08-19 NOTE — PATH
Surgical Pathology Report



Patient Name:  REKHA RIVERA

Accession #:  [id number]

Med. Rec. #:  [id number]                                                        

   /Age/Gender:  1965 (Age: 53) / F

Account:  [id number]                                                          

             Location: [facility] 

Taken:  8/15/2019

Received:  8/15/2019

Reported:  2019

Physicians:  Ulices Salcido

  



Specimen(s) Received

 DEBRIDED RIGHT BREAST TISSUE 





Clinical History

Post mastectomy open wound abdomen and right breast







Final Diagnosis

BREAST TISSUE, RIGHT, DEBRIDEMENT:

SKIN AND FIBROADIPOSE TISSUE SHOWING ULCERATION, ACUTE NECROTIZING INFLAMMATION

AND GRANULATION TISSUE FORMATION.





***Electronically Signed***

Sweta Prescott M.D.





Gross Description

Received in formalin labeled "right debrided breast tissue," is a 5.5 x 2.8 x

0.7 cm aggregate of multiple focally necrotic appearing portions of skin and

soft tissue. Representative sections are submitted in one cassette.

/2019



saudi

## 2019-08-19 NOTE — SURG
Surgery First Assist Note


First Assist: Jessica Sullivan PA-C (Suzy)


Date of Service: 08/15/19


Diagnosis: 





Necrotic tissue of right breast and abdomen


Procedure: 





1. Debridement and pulse lavage irrigation of breast wound and lower abdominal 

wound status post bilateral mastectomy with flap reconstruction. 


2. Advancement closure breast, right breast wound and abdominal wound. 


I was present for the entirety of the operative procedure. For further detail, 

please refer to operative report.








Visit type





- Case Type


Case Type: Scheduled





- Emergency


Emergency Visit: No





- New patient


This patient is new to me today: Yes


Date on this admission: 08/19/19





- Critical Care


Critical Care patient: No

## 2019-09-10 ENCOUNTER — FORM ENCOUNTER (OUTPATIENT)
Age: 54
End: 2019-09-10

## 2019-12-10 ENCOUNTER — FORM ENCOUNTER (OUTPATIENT)
Age: 54
End: 2019-12-10

## 2020-02-17 NOTE — OP
DATE OF OPERATION:  2019

 

PREOPERATIVE DIAGNOSIS:  Left breast multicentric upper quadrant breast cancer.

 

POSTOPERATIVE DIAGNOSIS:  Left breast multicentric upper quadrant breast cancer with

positive axillary lymph node.  

 

PRIMARY SURGEON:  Scotty Vazquez MD

 

FIRST ASSISTANT:  GUS Lau

 

PRIMARY SURGEON FOR THE BILATERAL DEEP FLAP RECONSTRUCTION:  Scotty Salcido MD

 

FIRST ASSISTANT:  Rusty Echeverria MD, as well as GUS Bowers

 

PROCEDURE:  Bilateral total mastectomies with left axillary sentinel lymph node

biopsy followed by left axillary lymph node dissection with bilateral deep flap

reconstructions.  

 

ANESTHESIA:  General endotracheal anesthesia. 

 

COMPLICATIONS:  There were no complications.

 

INDICATIONS:  Briefly, the patient is a 53-year-old  perimenopausal white female

of Hebrew-L[place]-English descent.  She has a strong family history with her sister

having breast cancer at age 60, brother had pancreatic cancer at age 71, another

brother had prostate cancer age 65, and a paternal uncle had breast cancer at age 80.

 Her maternal grandmother had ovarian cancer in her 40s, and her father had

colorectal cancer at age 68, and her mother had lung cancer at age 72.  The patient

was found to have some densities in the upper aspect of the left breast, in the upper

inner quadrant and upper outer quadrant on screening mammography and ultrasound in

May of 2019.  Ultrasound-guided core biopsies in the 1 and 11 o'clock regions showed

mixed ductal- lobular cancer, which was ER/VT positive, HER2/adam negative, with a

high Ki-67.  MRI showed the multicentric cancer in the left breast, but no

adenopathy, and the right breast was negative.  The patient underwent genetic testing

and was BRCA negative even though there was BRCA gene in the family.  She opted on

bilateral total mastectomies and understood the need for a sentinel lymph node

biopsy, possible axillary lymph node dissection.  She was seen by plastic surgery and

was felt to be a good candidate for deep flap reconstructions.  

 

DESCRIPTION OF PROCEDURE:  The patient was brought in for the procedure on 2019.  She first underwent a lymphoscintigraphy at [facility] and

then was brought up to the holding area at [facility].  In the

holding area, site verification was made and informed consent was obtained.  She was

marked preoperatively by the plastic surgeons.  

 

She was brought into the operating room, laid on the OR table in the supine position.

 Venodynes were placed on the lower extremities prior to induction.  She did receive

subcutaneous dose of Lovenox prior to the surgery.  Ancef 2 g were given prior to

incision.  She was given general endotracheal anesthesia.  Both breasts were

sterilely prepped and draped in the usual fashion.  The abdominal wall was also

prepped in the field for the deep flap reconstruction.  Then, 3 mL of lymphazurin

blue were injected intradermally and peritumorally and around the nipple areolar

complex.  Massage was instituted.  She was sterilely prepped and draped in the usual

fashion.  

 

The sentinel lymph node biopsy was first performed and dissection was undertaken

through an incision made just below the hair-bearing area of the left axilla.  A blue

hot node was easily found in the level 1 region of the left axilla.  It was removed,

had a 10-second gamma count of 41,029.  Two other nodes were also found, which were

slightly blue and hot, and the second sentinel lymph node had a 10-second gamma count

of 2,923, with the third sentinel lymph node having a 10-second gamma count of

24,015.  All sentinel nodes were sent for frozen section, and the first one came back

positive.  So, the patient understood that we were going forward with an axillary

lymph node dissection.  

 

We went ahead and performed the mastectomy through a slight reduction pattern

approach encompassing the entire nipple areolar complex, which was marked out by the

plastic surgeon.  Skin flaps were raised superiorly to the level of the clavicle,

medially to the level of the sternum, laterally to the level of the latissimus, and

inferiorly below the level of the inframammary fold.  The breast was taken out off

the pectoralis major muscle from medial to lateral, completely removed intact, and

the axillary dissection was undertaken and blocked with the mastectomy specimen.  A

full level 1 level 2 axillary dissection was undertaken using the axillary vein as a

superior border dissection, latissimus as a lateral border, and pectoralis minor as a

medial border.  The lymph nodes were completely cleared out using and sparing the

long thoracic  and thoracodorsal nerves throughout their entire courses.  Hemostasis

was achieved, and the wound was copiously irrigated with warm sterile saline. 

Separate anterior margins were taken on the upper inner and upper outer quadrants

with the suture marking the biopsy cavity side, and these were sent separately to

Pathology as anterior margins.  Specimen radiograph was performed on the left breast

and both clips were seen on the specimen radiograph.  

 

At this point, gloves, gowns were changed, and instruments were changed, and the

right breast was approached.  A prophylactic total mastectomy was performed on the

right side.  At this point, the plastic surgeon began performing the harvesting of

the abdominal tissue for the deep flap reconstruction.  Again, the right mastectomy

was performed through a slight reduction pattern approach marked out by the plastic

surgeons encompassing the entire nipple areolar complex.  The skin flaps were raised

superiorly to the level of the clavicle, medially to the level of the sternum,

laterally to the level of the latissimus, and inferiorly below the level of the

inframammary fold.  The breast was taken out off the pectoralis major muscle from

medial to lateral, and completely removed intact.  It was oriented with the

long-lateral short-superior suture, and weighed to allow for appropriate cosmetic

result.  All specimens were sent off the field in formalin, sent to Pathology at this

point.  There were some separate left axillary contents sent on the left side as a

separate specimen, as well.  Hemostasis was achieved.  

 

At this point, skin flaps were inspected and trimmed for good cosmetic result.  Dr. Salcido and Dr. Echeverria will dictate the rest of the reconstruction technique with

bilateral deep flap reconstructions.  All wounds will be closed by plastic surgery. 

She will have drains placed in the breasts postoperatively, and postoperatively will

be recovered in the post-anesthesia care unit, then, will be brought to the ICU where

she will be getting close Dopplers to the flaps and flap monitoring postoperatively. 

She did have a TAP block for postoperative pain control.  

 

All sponge and needle counts were correct at the end of the mastectomy, and estimated

blood loss was about 150 mL at the end of the mastectomy.  She was hemodynamically

stable throughout.  

 

 

SCOTTY VAZQUEZ M.D.

 

[id number]

DD: 2019 19:30

DT: 2019 07:19

Job #:  [id number] ED Tech unable to get blood specimen. Lab paged, patient in waiting room.

## 2020-06-09 ENCOUNTER — FORM ENCOUNTER (OUTPATIENT)
Age: 55
End: 2020-06-09

## 2021-09-15 PROBLEM — Z00.00 ENCOUNTER FOR PREVENTIVE HEALTH EXAMINATION: Status: ACTIVE | Noted: 2021-09-15

## 2021-10-18 DIAGNOSIS — Z80.0 FAMILY HISTORY OF MALIGNANT NEOPLASM OF DIGESTIVE ORGANS: ICD-10-CM

## 2021-10-18 DIAGNOSIS — Z80.41 FAMILY HISTORY OF MALIGNANT NEOPLASM OF OVARY: ICD-10-CM

## 2021-10-18 DIAGNOSIS — Z85.3 PERSONAL HISTORY OF MALIGNANT NEOPLASM OF BREAST: ICD-10-CM

## 2021-10-18 DIAGNOSIS — Z80.42 FAMILY HISTORY OF MALIGNANT NEOPLASM OF PROSTATE: ICD-10-CM

## 2021-10-18 DIAGNOSIS — Z78.9 OTHER SPECIFIED HEALTH STATUS: ICD-10-CM

## 2021-10-18 DIAGNOSIS — Z84.81 FAMILY HISTORY OF CARRIER OF GENETIC DISEASE: ICD-10-CM

## 2021-10-19 ENCOUNTER — NON-APPOINTMENT (OUTPATIENT)
Age: 56
End: 2021-10-19

## 2021-10-20 ENCOUNTER — APPOINTMENT (OUTPATIENT)
Dept: BREAST CENTER | Facility: CLINIC | Age: 56
End: 2021-10-20
Payer: COMMERCIAL

## 2021-10-20 VITALS — BODY MASS INDEX: 30.58 KG/M2 | HEIGHT: 61 IN | WEIGHT: 162 LBS

## 2021-10-20 PROCEDURE — 99213 OFFICE O/P EST LOW 20 MIN: CPT

## 2021-10-20 NOTE — HISTORY OF PRESENT ILLNESS
[FreeTextEntry1] : The patient is a 56-year-old G3, P2 postmenopausal white female of [place], American, and English descent.  She underwent menarche at age 12 and had her first child at age 26.  She has a strong family history with her sister who had breast cancer at age 60 who tested BRCA2 positive and her brother had pancreatic cancer at age 71 who also tested BRCA2 positive.  Another brother had prostate cancer at age 65.  She has a paternal uncle who had breast cancer at age 80.  Her paternal grandmother had ovarian cancer in her 40s.  Her father had colorectal cancer at age 68 and her mother had lung cancer at age 72.  The patient has a significant medical history of obesity.  She underwent a screening mammography and ultrasound on May 9, 2019 showing some calcifications and a slight density measuring about 9 mm in the upper inner aspect of the left breast.  Ultrasound showed a density in the left breast 1:00 region 2 cm from the nipple measuring 1.5 x 1.1 x 0.9 cm and there was also a separate suspicious lesion seen in the 11:00 aspect 3 cm from the nipple measuring 5 x 5 mm.  She underwent ultrasound-guided core biopsies of both of these areas on May 15, 2019 at [facility] showing invasive mammary carcinoma with mixed ductal and lobular phenotypes and these were both ER strongly positive at 100%, DE strongly positive at 95%, and HER-2/shayy negative by FISH.  One biopsy had a Ki-67 of 70% and the other had a Ki-67 of 45%.  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  She underwent bilateral total mastectomies with a left axillary sentinel lymph node biopsy followed by an axillary lymph node dissection for a positive sentinel lymph node and underwent bilateral MUSTAPHA flap reconstructions by Dr. Campbell and Dr. Laughlin on June 27, 2019.  Final pathology showed a multifocal multicentric left breast infiltrating lobular cancer with at least 3 foci in the upper quadrants measuring 1.3, 1.5, and 1.8 cm with other areas of DCIS in the upper inner quadrant.  All margins were free.  She had a total of 1 out of 9 positive lymph nodes with extranodal extension identified and the cancer remained ER/DE positive HER-2/shayy negative making this a pathologic prognostic stage IA breast cancer.  The right mastectomy just showed some classical type LCIS.  She was seen by Dr. Zavala who started her on chemotherapy.  She developed abdominal seromas in part of her lower abdominal wound opened up which required revision surgery by Dr. Campbell.  She finished chemotherapy on October 11, 2019 and now follows up with Dr. Lopez.  She was started on an aromatase inhibitor in January 2020 and comes in now for routine follow-up.

## 2021-10-20 NOTE — REASON FOR VISIT
[Follow-Up: _____] : a [unfilled] follow-up visit [FreeTextEntry1] : The patient comes in with a strong family history of breast, pancreatic, and ovarian cancer.  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  The patient was diagnosed with a multicentric left breast cancer after screening mammography and ultrasound in May 2019.  She was found to have a left breast 1:00 and 11:00 invasive mammary carcinoma with mixed ductal and lobular phenotypes which were ER/WY strongly positive and HER-2/shayy negative with high Ki-67's.  She underwent bilateral total mastectomies and required a left axillary lymph node dissection and had bilateral MUSTAPHA flap reconstructions in June 2019.  Final pathology showed a multicentric left breast cancer as an infiltrating lobular cancer with 3 separate foci the largest measuring 1.8 cm but she had free margins and 1 out of 19 positive nodes with extranodal extension making this a pathologic prognostic stage IA breast cancer.  She underwent chemotherapy but after radiation oncology evaluation it was felt that no radiation was needed and she was placed on an AI.  She comes in now for routine follow-up.

## 2021-10-20 NOTE — PHYSICAL EXAM
[Normocephalic] : normocephalic [Atraumatic] : atraumatic [EOMI] : extra ocular movement intact [Supple] : supple [No Supraclavicular Adenopathy] : no supraclavicular adenopathy [No Cervical Adenopathy] : no cervical adenopathy [Examined in the supine and seated position] : examined in the supine and seated position [No dominant masses] : no dominant masses in right breast  [No dominant masses] : no dominant masses left breast [Breast Mass Right Breast ___cm] : no masses [No Axillary Lymphadenopathy] : no left axillary lymphadenopathy [No Edema] : no edema [No Rashes] : no rashes [No Ulceration] : no ulceration [de-identified] : On exam, the patient has bilateral total mastectomies and underwent a left axilla lymph node dissection and had bilateral MUSTAPHA flap reconstructions.  She did have some skin loss on the lower aspect of the right mastectomy wound but this has healed.  She also had an umbilical wound and lower abdominal wound which have healed.  On palpation, I cannot feel any suspicious densities in either MUSTAPHA flap.  She did get nipple reconstructions and tattooing.  She does have a density on the medial aspect of the left reconstruction which has a benign well circumscribed nature on directed ultrasound and is benign. [de-identified] : Status post prophylactic total mastectomy with MUSTAPHA flap reconstruction with no suspicious findings [de-identified] : Status post prophylactic total mastectomy with MUSTAPHA flap reconstruction with no evidence of recurrence.  Palpable left breast medial density which on directed ultrasound has a benign appearance. [de-identified] : Medial left breast density with well-circumscribed appearance on ultrasound consistent with a benign density.

## 2021-10-20 NOTE — ASSESSMENT
[FreeTextEntry1] : The patient is a 56-year-old G3, P2 postmenopausal white female of [place], [place], and English descent.  She underwent menarche at age 12 and had her first child at age 26.  She has a strong family history with her sister who had breast cancer at age 60 who tested BRCA2 positive and her brother had pancreatic cancer at age 71 who also tested BRCA2 positive.  Another brother had prostate cancer at age 65.  She has a paternal uncle who had breast cancer at age 80.  Her paternal grandmother had ovarian cancer in her 40s.  Her father had colorectal cancer at age 68 and her mother had lung cancer at age 72.  The patient has a significant medical history of obesity.  She underwent a screening mammography and ultrasound on May 9, 2019 showing some calcifications and a slight density measuring about 9 mm in the upper inner aspect of the left breast.  Ultrasound showed a density in the left breast 1:00 region 2 cm from the nipple measuring 1.5 x 1.1 x 0.9 cm and there was also a separate suspicious lesion seen in the 11:00 aspect 3 cm from the nipple measuring 5 x 5 mm.  She underwent ultrasound-guided core biopsies of both of these areas on May 15, 2019 at [facility] showing invasive mammary carcinoma with mixed ductal and lobular phenotypes and these were both ER strongly positive at 100%, VT strongly positive at 95%, and HER-2/shayy negative by FISH.  One biopsy had a Ki-67 of 70% and the other had a Ki-67 of 45%.  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  She underwent bilateral total mastectomies with a left axillary sentinel lymph node biopsy followed by an axillary lymph node dissection for a positive sentinel lymph node and underwent bilateral MUSTAPHA flap reconstructions by Dr. Campbell and Dr. Laughlin on June 27, 2019.  Final pathology showed a multifocal multicentric left breast infiltrating lobular cancer with at least 3 foci in the upper quadrants measuring 1.3, 1.5, and 1.8 cm with other areas of DCIS in the upper inner quadrant.  All margins were free.  She had a total of 1 out of 9 positive lymph nodes with extranodal extension identified and the cancer remained ER/VT positive HER-2/shayy negative making this a pathologic prognostic stage IA breast cancer.  The right mastectomy just showed some classical type LCIS.  She was seen by Dr. Zavala who started her on chemotherapy.  She developed abdominal seromas in part of her lower abdominal wound opened up which required revision surgery by Dr. Campbell.  She finished chemotherapy on October 11, 2019 and now follows up with Dr. Lopez.  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  On exam today, I cannot feel any suspicious findings over either MUSTAPHA flap reconstruction just a benign left breast medial density which is benign appearing on directed ultrasound. The patient was reassured but should continue 6-month follow-up again in April 2022.  She should remain on the exemestane and continue to follow-up with Dr. Lopez.

## 2021-10-22 ENCOUNTER — TRANSCRIPTION ENCOUNTER (OUTPATIENT)
Age: 56
End: 2021-10-22

## 2022-04-06 ENCOUNTER — APPOINTMENT (OUTPATIENT)
Dept: BREAST CENTER | Facility: CLINIC | Age: 57
End: 2022-04-06
Payer: COMMERCIAL

## 2022-04-06 VITALS
BODY MASS INDEX: 45.99 KG/M2 | WEIGHT: 293 LBS | SYSTOLIC BLOOD PRESSURE: 142 MMHG | HEIGHT: 67 IN | DIASTOLIC BLOOD PRESSURE: 90 MMHG | HEART RATE: 73 BPM

## 2022-04-06 PROCEDURE — 99213 OFFICE O/P EST LOW 20 MIN: CPT

## 2022-04-06 NOTE — ASSESSMENT
[FreeTextEntry1] : The patient is a 56-year-old G3, P2 postmenopausal white female of [place], [place], and English descent.  She underwent menarche at age 12 and had her first child at age 26.  She has a strong family history with her sister who had breast cancer at age 60 who tested BRCA2 positive and her brother had pancreatic cancer at age 71 who also tested BRCA2 positive.  Another brother had prostate cancer at age 65.  She has a paternal uncle who had breast cancer at age 80.  Her paternal grandmother had ovarian cancer in her 40s.  Her father had colorectal cancer at age 68 and her mother had lung cancer at age 72.  The patient has a significant medical history of obesity.  She underwent a screening mammography and ultrasound on May 9, 2019 showing some calcifications and a slight density measuring about 9 mm in the upper inner aspect of the left breast.  Ultrasound showed a density in the left breast 1:00 region 2 cm from the nipple measuring 1.5 x 1.1 x 0.9 cm and there was also a separate suspicious lesion seen in the 11:00 aspect 3 cm from the nipple measuring 5 x 5 mm.  She underwent ultrasound-guided core biopsies of both of these areas on May 15, 2019 at [facility] showing invasive mammary carcinoma with mixed ductal and lobular phenotypes and these were both ER strongly positive at 100%, GA strongly positive at 95%, and HER-2/shayy negative by FISH.  One biopsy had a Ki-67 of 70% and the other had a Ki-67 of 45%.  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  She underwent bilateral total mastectomies with a left axillary sentinel lymph node biopsy followed by an axillary lymph node dissection for a positive sentinel lymph node and underwent bilateral MUSTAPHA flap reconstructions by Dr. Campbell and Dr. Laughlin on June 27, 2019.  Final pathology showed a multifocal multicentric left breast infiltrating lobular cancer with at least 3 foci in the upper quadrants measuring 1.3, 1.5, and 1.8 cm with other areas of DCIS in the upper inner quadrant.  All margins were free.  She had a total of 1 out of 9 positive lymph nodes with extranodal extension identified and the cancer remained ER/GA positive HER-2/shayy negative making this a pathologic prognostic stage IA breast cancer.  The right mastectomy just showed some classical type LCIS.  She was seen by Dr. Zavala who started her on chemotherapy.  She developed abdominal seromas and part of her lower abdominal wound opened up which required revision surgery by Dr. Campbell.  She finished chemotherapy on October 11, 2019 and now follows up with Dr. Lopez.  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  On exam today, I cannot feel any suspicious findings over either MUSTAPHA flap reconstruction just a benign left breast medial density which is benign appearing on directed ultrasound. The patient was reassured but should continue 6-month follow-up again in October 2022.  She should  continue to follow-up with Dr. Lopez and speak to him about going back on endocrine therapy.

## 2022-04-06 NOTE — HISTORY OF PRESENT ILLNESS
[FreeTextEntry1] : The patient is a 56-year-old G3, P2 postmenopausal white female of [place], [place], and English descent.  She underwent menarche at age 12 and had her first child at age 26.  She has a strong family history with her sister who had breast cancer at age 60 who tested BRCA2 positive and her brother had pancreatic cancer at age 71 who also tested BRCA2 positive.  Another brother had prostate cancer at age 65.  She has a paternal uncle who had breast cancer at age 80.  Her paternal grandmother had ovarian cancer in her 40s.  Her father had colorectal cancer at age 68 and her mother had lung cancer at age 72.  The patient has a significant medical history of obesity.  She underwent a screening mammography and ultrasound on May 9, 2019 showing some calcifications and a slight density measuring about 9 mm in the upper inner aspect of the left breast.  Ultrasound showed a density in the left breast 1:00 region 2 cm from the nipple measuring 1.5 x 1.1 x 0.9 cm and there was also a separate suspicious lesion seen in the 11:00 aspect 3 cm from the nipple measuring 5 x 5 mm.  She underwent ultrasound-guided core biopsies of both of these areas on May 15, 2019 at [facility] showing invasive mammary carcinoma with mixed ductal and lobular phenotypes and these were both ER strongly positive at 100%, NH strongly positive at 95%, and HER-2/shayy negative by FISH.  One biopsy had a Ki-67 of 70% and the other had a Ki-67 of 45%.  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  She underwent bilateral total mastectomies with a left axillary sentinel lymph node biopsy followed by an axillary lymph node dissection for a positive sentinel lymph node and underwent bilateral MUSTAPHA flap reconstructions by Dr. Campbell and Dr. Laughlin on June 27, 2019.  Final pathology showed a multifocal multicentric left breast infiltrating lobular cancer with at least 3 foci in the upper quadrants measuring 1.3, 1.5, and 1.8 cm with other areas of DCIS in the upper inner quadrant.  All margins were free.  She had a total of 1 out of 9 positive lymph nodes with extranodal extension identified and the cancer remained ER/NH positive HER-2/shayy negative making this a pathologic prognostic stage IA breast cancer.  The right mastectomy just showed some classical type LCIS.  She was seen by Dr. Zavala who started her on chemotherapy.  She developed abdominal seromas in part of her lower abdominal wound opened up which required revision surgery by Dr. Campbell.  She finished chemotherapy on October 11, 2019 and now follows up with Dr. Lopez.  She was started on an aromatase inhibitor in January 2020 but has had tachycardic events on Arimidex as well as exemestane and comes in now for routine follow-up.

## 2022-04-06 NOTE — PHYSICAL EXAM
[Normocephalic] : normocephalic [Atraumatic] : atraumatic [EOMI] : extra ocular movement intact [Supple] : supple [No Supraclavicular Adenopathy] : no supraclavicular adenopathy [No Cervical Adenopathy] : no cervical adenopathy [Examined in the supine and seated position] : examined in the supine and seated position [No dominant masses] : no dominant masses in right breast  [No dominant masses] : no dominant masses left breast [Breast Mass Right Breast ___cm] : no masses [No Axillary Lymphadenopathy] : no left axillary lymphadenopathy [No Edema] : no edema [No Rashes] : no rashes [No Ulceration] : no ulceration [de-identified] : On exam, the patient has bilateral total mastectomies and underwent a left axilla lymph node dissection and had bilateral MUSTAPHA flap reconstructions.  She did have some skin loss on the lower aspect of the right mastectomy wound but this has healed.  She also had an umbilical wound and lower abdominal wound which have healed.  On palpation, I cannot feel any suspicious densities in either MUSTAPHA flap.  She did get nipple reconstructions and tattooing.  She does have a density on the medial aspect of the left reconstruction which has a benign well circumscribed nature on directed ultrasound and is benign. [de-identified] : Status post prophylactic total mastectomy with MUSTAPHA flap reconstruction with no suspicious findings [de-identified] : Status post prophylactic total mastectomy with MUSTAPHA flap reconstruction with no evidence of recurrence.  Palpable left breast medial density which has been chronic and benign. [de-identified] : Medial left breast density with well-circumscribed appearance and has been chronic and consistent with a benign density.

## 2022-04-06 NOTE — REASON FOR VISIT
[Follow-Up: _____] : a [unfilled] follow-up visit [FreeTextEntry1] : The patient comes in with a strong family history of breast, pancreatic, and ovarian cancer.  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  The patient was diagnosed with a multicentric left breast cancer after screening mammography and ultrasound in May 2019.  She was found to have a left breast 1:00 and 11:00 invasive mammary carcinoma with mixed ductal and lobular phenotypes which were ER/OK strongly positive and HER-2/shayy negative with high Ki-67's.  She underwent bilateral total mastectomies and required a left axillary lymph node dissection and had bilateral MUSTAPHA flap reconstructions in June 2019.  Final pathology showed a multicentric left breast cancer as an infiltrating lobular cancer with 3 separate foci the largest measuring 1.8 cm but she had free margins and 1 out of 19 positive nodes with extranodal extension making this a pathologic prognostic stage IA breast cancer.  She underwent chemotherapy but after radiation oncology evaluation it was felt that no radiation was needed and she was placed on an AI but had to come off due to tachycardic events.  She comes in now for routine follow-up.

## 2022-10-18 ENCOUNTER — APPOINTMENT (OUTPATIENT)
Dept: BREAST CENTER | Facility: CLINIC | Age: 57
End: 2022-10-18

## 2022-10-18 VITALS
SYSTOLIC BLOOD PRESSURE: 140 MMHG | HEART RATE: 65 BPM | HEIGHT: 67 IN | WEIGHT: 293 LBS | BODY MASS INDEX: 45.99 KG/M2 | DIASTOLIC BLOOD PRESSURE: 88 MMHG | OXYGEN SATURATION: 100 %

## 2022-10-18 PROCEDURE — 99213 OFFICE O/P EST LOW 20 MIN: CPT

## 2022-10-18 NOTE — ASSESSMENT
[FreeTextEntry1] : The patient is a 57-year-old G3, P2 postmenopausal white female of [place], [place], and English descent.  She underwent menarche at age 12 and had her first child at age 26.  She has a strong family history with her sister who had breast cancer at age 60 who tested BRCA2 positive and her brother had pancreatic cancer at age 71 who also tested BRCA2 positive.  Another brother had prostate cancer at age 65.  She has a paternal uncle who had breast cancer at age 80.  Her paternal grandmother had ovarian cancer in her 40s.  Her father had colorectal cancer at age 68 and her mother had lung cancer at age 72.  The patient has a significant medical history of obesity.  She underwent a screening mammography and ultrasound on May 9, 2019 showing some calcifications and a slight density measuring about 9 mm in the upper inner aspect of the left breast.  Ultrasound showed a density in the left breast 1:00 region 2 cm from the nipple measuring 1.5 x 1.1 x 0.9 cm and there was also a separate suspicious lesion seen in the 11:00 aspect 3 cm from the nipple measuring 5 x 5 mm.  She underwent ultrasound-guided core biopsies of both of these areas on May 15, 2019 at [facility] [place] showing invasive mammary carcinoma with mixed ductal and lobular phenotypes and these were both ER strongly positive at 100%, NM strongly positive at 95%, and HER-2/shayy negative by FISH.  One biopsy had a Ki-67 of 70% and the other had a Ki-67 of 45%.  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  She underwent bilateral total mastectomies with a left axillary sentinel lymph node biopsy followed by an axillary lymph node dissection for a positive sentinel lymph node and underwent bilateral MUSTAPHA flap reconstructions by Dr. Campbell and Dr. Laughlin on June 27, 2019.  Final pathology showed a multifocal multicentric left breast infiltrating lobular cancer with at least 3 foci in the upper quadrants measuring 1.3, 1.5, and 1.8 cm with other areas of DCIS in the upper inner quadrant.  All margins were free.  She had a total of 1 out of 9 positive lymph nodes with extranodal extension identified and the cancer remained ER/NM positive HER-2/shayy negative making this a pathologic prognostic stage IA breast cancer.  The right mastectomy just showed some classical type LCIS.  She was seen by Dr. Zavala who started her on chemotherapy.  She developed abdominal seromas and part of her lower abdominal wound opened up which required revision surgery by Dr. Campbell.  She finished chemotherapy on October 11, 2019 and now follows up with Dr. Lopez.  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  On exam today, I cannot feel any suspicious findings over either MUSTAPHA flap reconstruction and the previously felt left breast medial density has resolved. The patient was reassured but should continue 6-month follow-up again in April 2023.  She should  continue to follow-up with Dr. Lopez and he has decided to keep her off endocrine therapy.  Of note, Dr. Lopez is going to retire soon.

## 2022-10-18 NOTE — REASON FOR VISIT
[Follow-Up: _____] : a [unfilled] follow-up visit [FreeTextEntry1] : The patient comes in with a strong family history of breast, pancreatic, and ovarian cancer.  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  The patient was diagnosed with a multicentric left breast cancer after screening mammography and ultrasound in May 2019.  She was found to have a left breast 1:00 and 11:00 invasive mammary carcinoma with mixed ductal and lobular phenotypes which were ER/DC strongly positive and HER-2/shayy negative with high Ki-67's.  She underwent bilateral total mastectomies and required a left axillary lymph node dissection and had bilateral MUSTAPHA flap reconstructions in June 2019.  Final pathology showed a multicentric left breast cancer as an infiltrating lobular cancer with 3 separate foci the largest measuring 1.8 cm but she had free margins and 1 out of 19 positive nodes with extranodal extension making this a pathologic prognostic stage IA breast cancer.  She underwent chemotherapy but after radiation oncology evaluation it was felt that no radiation was needed and she was placed on an AI but had to come off due to tachycardic events.  She comes in now for routine follow-up.

## 2022-10-18 NOTE — HISTORY OF PRESENT ILLNESS
[FreeTextEntry1] : The patient is a 57-year-old G3, P2 postmenopausal white female of [place], [place], and English descent.  She underwent menarche at age 12 and had her first child at age 26.  She has a strong family history with her sister who had breast cancer at age 60 who tested BRCA2 positive and her brother had pancreatic cancer at age 71 who also tested BRCA2 positive.  Another brother had prostate cancer at age 65.  She has a paternal uncle who had breast cancer at age 80.  Her paternal grandmother had ovarian cancer in her 40s.  Her father had colorectal cancer at age 68 and her mother had lung cancer at age 72.  The patient has a significant medical history of obesity.  She underwent a screening mammography and ultrasound on May 9, 2019 showing some calcifications and a slight density measuring about 9 mm in the upper inner aspect of the left breast.  Ultrasound showed a density in the left breast 1:00 region 2 cm from the nipple measuring 1.5 x 1.1 x 0.9 cm and there was also a separate suspicious lesion seen in the 11:00 aspect 3 cm from the nipple measuring 5 x 5 mm.  She underwent ultrasound-guided core biopsies of both of these areas on May 15, 2019 at [facility] showing invasive mammary carcinoma with mixed ductal and lobular phenotypes and these were both ER strongly positive at 100%, AK strongly positive at 95%, and HER-2/shayy negative by FISH.  One biopsy had a Ki-67 of 70% and the other had a Ki-67 of 45%.  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  She underwent bilateral total mastectomies with a left axillary sentinel lymph node biopsy followed by an axillary lymph node dissection for a positive sentinel lymph node and underwent bilateral MUSTAPHA flap reconstructions by Dr. Campbell and Dr. Laughlin on June 27, 2019.  Final pathology showed a multifocal multicentric left breast infiltrating lobular cancer with at least 3 foci in the upper quadrants measuring 1.3, 1.5, and 1.8 cm with other areas of DCIS in the upper inner quadrant.  All margins were free.  She had a total of 1 out of 9 positive lymph nodes with extranodal extension identified and the cancer remained ER/AK positive HER-2/shayy negative making this a pathologic prognostic stage IA breast cancer.  The right mastectomy just showed some classical type LCIS.  She was seen by Dr. Zavala who started her on chemotherapy.  She developed abdominal seromas in part of her lower abdominal wound opened up which required revision surgery by Dr. Campbell.  She finished chemotherapy on October 11, 2019 and now follows up with Dr. Lopez.  She was started on an aromatase inhibitor in January 2020 but has had tachycardic events on Arimidex as well as exemestane and comes in now for routine follow-up.

## 2022-10-18 NOTE — PHYSICAL EXAM
[Normocephalic] : normocephalic [Atraumatic] : atraumatic [EOMI] : extra ocular movement intact [Supple] : supple [No Supraclavicular Adenopathy] : no supraclavicular adenopathy [No Cervical Adenopathy] : no cervical adenopathy [Examined in the supine and seated position] : examined in the supine and seated position [No dominant masses] : no dominant masses in right breast  [No dominant masses] : no dominant masses left breast [Breast Mass Right Breast ___cm] : no masses [No Axillary Lymphadenopathy] : no left axillary lymphadenopathy [No Edema] : no edema [No Rashes] : no rashes [No Ulceration] : no ulceration [de-identified] : On exam, the patient has bilateral total mastectomies and underwent a left axilla lymph node dissection and had bilateral MUSTAPHA flap reconstructions.  She did have some skin loss on the lower aspect of the right mastectomy wound but this has healed.  She also had an umbilical wound and lower abdominal wound which have healed.  On palpation, I cannot feel any suspicious densities in either MUSTAPHA flap.  She did get nipple reconstructions and tattooing.  She does have a density on the medial aspect of the left reconstruction which has a benign well circumscribed nature on directed ultrasound and is benign. [de-identified] : Status post prophylactic total mastectomy with MUSTAPHA flap reconstruction with no suspicious findings [de-identified] : Status post prophylactic total mastectomy with MUSTAPHA flap reconstruction with no evidence of recurrence.  Palpable left breast medial density which has been chronic and benign. [de-identified] : Medial left breast density with well-circumscribed appearance and has been chronic and consistent with a benign density.

## 2023-04-10 NOTE — ASSESSMENT
[FreeTextEntry1] : The patient is a 57-year-old G3, P2 postmenopausal white female of [place], [place], and English descent.  She underwent menarche at age 12 and had her first child at age 26.  She has a strong family history with her sister who had breast cancer at age 60 who tested BRCA2 positive and her brother had pancreatic cancer at age 71 who also tested BRCA2 positive.  Another brother had prostate cancer at age 65.  She has a paternal uncle who had breast cancer at age 80.  Her paternal grandmother had ovarian cancer in her 40s.  Her father had colorectal cancer at age 68 and her mother had lung cancer at age 72.  The patient has a significant medical history of obesity.  She underwent a screening mammography and ultrasound on May 9, 2019 showing some calcifications and a slight density measuring about 9 mm in the upper inner aspect of the left breast.  Ultrasound showed a density in the left breast 1:00 region 2 cm from the nipple measuring 1.5 x 1.1 x 0.9 cm and there was also a separate suspicious lesion seen in the 11:00 aspect 3 cm from the nipple measuring 5 x 5 mm.  She underwent ultrasound-guided core biopsies of both of these areas on May 15, 2019 at [facility] showing invasive mammary carcinoma with mixed ductal and lobular phenotypes and these were both ER strongly positive at 100%, WV strongly positive at 95%, and HER-2/shayy negative by FISH.  One biopsy had a Ki-67 of 70% and the other had a Ki-67 of 45%.  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  She underwent bilateral total mastectomies with a left axillary sentinel lymph node biopsy followed by an axillary lymph node dissection for a positive sentinel lymph node and underwent bilateral MUSTAPHA flap reconstructions by Dr. Campbell and Dr. Laughlin on June 27, 2019.  Final pathology showed a multifocal multicentric left breast infiltrating lobular cancer with at least 3 foci in the upper quadrants measuring 1.3, 1.5, and 1.8 cm with other areas of DCIS in the upper inner quadrant.  All margins were free.  She had a total of 1 out of 9 positive lymph nodes with extranodal extension identified and the cancer remained ER/WV positive HER-2/shayy negative making this a pathologic prognostic stage IA breast cancer.  The right mastectomy just showed some classical type LCIS.  She was seen by Dr. Zavala who started her on chemotherapy.  She developed abdominal seromas and part of her lower abdominal wound opened up which required revision surgery by Dr. Campbell.  She finished chemotherapy on October 11, 2019 and now follows up with Dr. Lpoez.  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  On exam today, I cannot feel any suspicious findings over either MUSTAPHA flap reconstruction and a previously felt left breast medial density has resolved. The patient was reassured but should continue 6-month follow-up again in October 2023.  She should  continue to follow-up with Dr. Lopez and he has decided to keep her off endocrine therapy.  Of note, Dr. Lopez is going to retire soon.

## 2023-04-10 NOTE — REASON FOR VISIT
[Follow-Up: _____] : a [unfilled] follow-up visit [FreeTextEntry1] : The patient comes in with a strong family history of breast, pancreatic, and ovarian cancer.  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  The patient was diagnosed with a multicentric left breast cancer after screening mammography and ultrasound in May 2019.  She was found to have a left breast 1:00 and 11:00 invasive mammary carcinoma with mixed ductal and lobular phenotypes which were ER/WI strongly positive and HER-2/shayy negative with high Ki-67's.  She underwent bilateral total mastectomies and required a left axillary lymph node dissection and had bilateral MUSTAPHA flap reconstructions in June 2019.  Final pathology showed a multicentric left breast cancer as an infiltrating lobular cancer with 3 separate foci the largest measuring 1.8 cm but she had free margins and 1 out of 19 positive nodes with extranodal extension making this a pathologic prognostic stage IA breast cancer.  She underwent chemotherapy but after radiation oncology evaluation it was felt that no radiation was needed and she was placed on an AI but had to come off due to tachycardic events.  She comes in now for routine follow-up.

## 2023-04-10 NOTE — HISTORY OF PRESENT ILLNESS
[FreeTextEntry1] : The patient is a 57-year-old G3, P2 postmenopausal white female of [place], [place], and English descent.  She underwent menarche at age 12 and had her first child at age 26.  She has a strong family history with her sister who had breast cancer at age 60 who tested BRCA2 positive and her brother had pancreatic cancer at age 71 who also tested BRCA2 positive.  Another brother had prostate cancer at age 65.  She has a paternal uncle who had breast cancer at age 80.  Her paternal grandmother had ovarian cancer in her 40s.  Her father had colorectal cancer at age 68 and her mother had lung cancer at age 72.  The patient has a significant medical history of obesity.  She underwent a screening mammography and ultrasound on May 9, 2019 showing some calcifications and a slight density measuring about 9 mm in the upper inner aspect of the left breast.  Ultrasound showed a density in the left breast 1:00 region 2 cm from the nipple measuring 1.5 x 1.1 x 0.9 cm and there was also a separate suspicious lesion seen in the 11:00 aspect 3 cm from the nipple measuring 5 x 5 mm.  She underwent ultrasound-guided core biopsies of both of these areas on May 15, 2019 at [facility] showing invasive mammary carcinoma with mixed ductal and lobular phenotypes and these were both ER strongly positive at 100%, AL strongly positive at 95%, and HER-2/shayy negative by FISH.  One biopsy had a Ki-67 of 70% and the other had a Ki-67 of 45%.  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  She underwent bilateral total mastectomies with a left axillary sentinel lymph node biopsy followed by an axillary lymph node dissection for a positive sentinel lymph node and underwent bilateral MUSTAPHA flap reconstructions by Dr. Campbell and Dr. Laughlin on June 27, 2019.  Final pathology showed a multifocal multicentric left breast infiltrating lobular cancer with at least 3 foci in the upper quadrants measuring 1.3, 1.5, and 1.8 cm with other areas of DCIS in the upper inner quadrant.  All margins were free.  She had a total of 1 out of 9 positive lymph nodes with extranodal extension identified and the cancer remained ER/AL positive HER-2/shayy negative making this a pathologic prognostic stage IA breast cancer.  The right mastectomy just showed some classical type LCIS.  She was seen by Dr. Zavala who started her on chemotherapy.  She developed abdominal seromas in part of her lower abdominal wound opened up which required revision surgery by Dr. Campbell.  She finished chemotherapy on October 11, 2019 and now follows up with Dr. Lopez.  She was started on an aromatase inhibitor in January 2020 but has had tachycardic events on Arimidex as well as exemestane and remains off all endocrine therapy. She comes in now for routine follow-up.

## 2023-04-10 NOTE — PHYSICAL EXAM
[Normocephalic] : normocephalic [Atraumatic] : atraumatic [EOMI] : extra ocular movement intact [Supple] : supple [No Supraclavicular Adenopathy] : no supraclavicular adenopathy [No Cervical Adenopathy] : no cervical adenopathy [Examined in the supine and seated position] : examined in the supine and seated position [No dominant masses] : no dominant masses in right breast  [No dominant masses] : no dominant masses left breast [Breast Mass Right Breast ___cm] : no masses [No Axillary Lymphadenopathy] : no left axillary lymphadenopathy [No Edema] : no edema [No Rashes] : no rashes [No Ulceration] : no ulceration [de-identified] : On exam, the patient has bilateral total mastectomies and underwent a left axilla lymph node dissection and had bilateral MUSTAPHA flap reconstructions.  She did have some skin loss on the lower aspect of the right mastectomy wound but this has healed.  She also had an umbilical wound and lower abdominal wound which have healed.  On palpation, I cannot feel any suspicious densities in either MUSTAPHA flap.  She did get nipple reconstructions and tattooing.  She does have a density on the medial aspect of the left reconstruction which has a benign well circumscribed nature on directed ultrasound and is benign. [de-identified] : Status post prophylactic total mastectomy with MUSTAPHA flap reconstruction with no suspicious findings [de-identified] : Status post prophylactic total mastectomy with MUSTAPHA flap reconstruction with no evidence of recurrence.  Palpable left breast medial density which has been chronic and benign. [de-identified] : Medial left breast density with well-circumscribed appearance and has been chronic and consistent with a benign density.

## 2023-04-17 ENCOUNTER — APPOINTMENT (OUTPATIENT)
Dept: BREAST CENTER | Facility: CLINIC | Age: 58
End: 2023-04-17
Payer: COMMERCIAL

## 2023-05-01 ENCOUNTER — APPOINTMENT (OUTPATIENT)
Dept: BREAST CENTER | Facility: CLINIC | Age: 58
End: 2023-05-01
Payer: COMMERCIAL

## 2023-05-01 PROCEDURE — 99213 OFFICE O/P EST LOW 20 MIN: CPT

## 2023-05-01 NOTE — REASON FOR VISIT
[Follow-Up: _____] : a [unfilled] follow-up visit [FreeTextEntry1] : The patient comes in with a strong family history of breast, pancreatic, and ovarian cancer.  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  The patient was diagnosed with a multicentric left breast cancer after screening mammography and ultrasound in May 2019.  She was found to have a left breast 1:00 and 11:00 invasive mammary carcinoma with mixed ductal and lobular phenotypes which were ER/ID strongly positive and HER-2/shayy negative with high Ki-67's.  She underwent bilateral total mastectomies and required a left axillary lymph node dissection and had bilateral MUSTAPHA flap reconstructions in June 2019.  Final pathology showed a multicentric left breast cancer as an infiltrating lobular cancer with 3 separate foci the largest measuring 1.8 cm but she had free margins and 1 out of 19 positive nodes with extranodal extension making this a pathologic prognostic stage IA breast cancer.  She underwent chemotherapy but after radiation oncology evaluation it was felt that no radiation was needed and she was placed on an AI but had to come off due to tachycardic events.  She comes in now for routine follow-up.

## 2023-05-01 NOTE — ASSESSMENT
[FreeTextEntry1] : The patient is a 57-year-old G3, P2 postmenopausal white female of [place], [place], and English descent.  She underwent menarche at age 12 and had her first child at age 26.  She has a strong family history with her sister who had breast cancer at age 60 who tested BRCA2 positive and her brother had pancreatic cancer at age 71 who also tested BRCA2 positive.  Another brother had prostate cancer at age 65.  She has a paternal uncle who had breast cancer at age 80.  Her paternal grandmother had ovarian cancer in her 40s.  Her father had colorectal cancer at age 68 and her mother had lung cancer at age 72.  The patient has a significant medical history of obesity.  She underwent a screening mammography and ultrasound on May 9, 2019 showing some calcifications and a slight density measuring about 9 mm in the upper inner aspect of the left breast.  Ultrasound showed a density in the left breast 1:00 region 2 cm from the nipple measuring 1.5 x 1.1 x 0.9 cm and there was also a separate suspicious lesion seen in the 11:00 aspect 3 cm from the nipple measuring 5 x 5 mm.  She underwent ultrasound-guided core biopsies of both of these areas on May 15, 2019 at [facility] showing invasive mammary carcinoma with mixed ductal and lobular phenotypes and these were both ER strongly positive at 100%, MS strongly positive at 95%, and HER-2/shayy negative by FISH.  One biopsy had a Ki-67 of 70% and the other had a Ki-67 of 45%.  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  She underwent bilateral total mastectomies with a left axillary sentinel lymph node biopsy followed by an axillary lymph node dissection for a positive sentinel lymph node and underwent bilateral MUSTAPHA flap reconstructions by Dr. Campbell and Dr. Laughlin on June 27, 2019.  Final pathology showed a multifocal multicentric left breast infiltrating lobular cancer with at least 3 foci in the upper quadrants measuring 1.3, 1.5, and 1.8 cm with other areas of DCIS in the upper inner quadrant.  All margins were free.  She had a total of 1 out of 9 positive lymph nodes with extranodal extension identified and the cancer remained ER/MS positive HER-2/shayy negative making this a pathologic prognostic stage IA breast cancer.  The right mastectomy just showed some classical type LCIS.  She was seen by Dr. Zavala who started her on chemotherapy.  She developed abdominal seromas and part of her lower abdominal wound opened up which required revision surgery by Dr. Campbell.  She finished chemotherapy on October 11, 2019 and now follows up with Dr. Lopez.  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  On exam today, I cannot feel any suspicious findings over either MUSTAPHA flap reconstruction and a previously felt left breast medial density has resolved. The patient was reassured but should continue 6-month follow-up again in October 2023.  She should  continue to follow-up with Dr. Lopez and he has decided to keep her off endocrine therapy.  Of note, Dr. Lopez is going to retire soon.

## 2023-05-01 NOTE — HISTORY OF PRESENT ILLNESS
[FreeTextEntry1] : The patient is a 57-year-old G3, P2 postmenopausal white female of [place], [place], and English descent.  She underwent menarche at age 12 and had her first child at age 26.  She has a strong family history with her sister who had breast cancer at age 60 who tested BRCA2 positive and her brother had pancreatic cancer at age 71 who also tested BRCA2 positive.  Another brother had prostate cancer at age 65.  She has a paternal uncle who had breast cancer at age 80.  Her paternal grandmother had ovarian cancer in her 40s.  Her father had colorectal cancer at age 68 and her mother had lung cancer at age 72.  The patient has a significant medical history of obesity.  She underwent a screening mammography and ultrasound on May 9, 2019 showing some calcifications and a slight density measuring about 9 mm in the upper inner aspect of the left breast.  Ultrasound showed a density in the left breast 1:00 region 2 cm from the nipple measuring 1.5 x 1.1 x 0.9 cm and there was also a separate suspicious lesion seen in the 11:00 aspect 3 cm from the nipple measuring 5 x 5 mm.  She underwent ultrasound-guided core biopsies of both of these areas on May 15, 2019 at [facility] showing invasive mammary carcinoma with mixed ductal and lobular phenotypes and these were both ER strongly positive at 100%, MD strongly positive at 95%, and HER-2/shayy negative by FISH.  One biopsy had a Ki-67 of 70% and the other had a Ki-67 of 45%.  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  She underwent bilateral total mastectomies with a left axillary sentinel lymph node biopsy followed by an axillary lymph node dissection for a positive sentinel lymph node and underwent bilateral MUSTAPHA flap reconstructions by Dr. Campbell and Dr. Laughlin on June 27, 2019.  Final pathology showed a multifocal multicentric left breast infiltrating lobular cancer with at least 3 foci in the upper quadrants measuring 1.3, 1.5, and 1.8 cm with other areas of DCIS in the upper inner quadrant.  All margins were free.  She had a total of 1 out of 9 positive lymph nodes with extranodal extension identified and the cancer remained ER/MD positive HER-2/shayy negative making this a pathologic prognostic stage IA breast cancer.  The right mastectomy just showed some classical type LCIS.  She was seen by Dr. Zavala who started her on chemotherapy.  She developed abdominal seromas in part of her lower abdominal wound opened up which required revision surgery by Dr. Campbell.  She finished chemotherapy on October 11, 2019 and now follows up with Dr. Lopez.  She was started on an aromatase inhibitor in January 2020 but has had tachycardic events on Arimidex as well as exemestane and remains off all endocrine therapy. She comes in now for routine follow-up.

## 2023-05-01 NOTE — PHYSICAL EXAM
[Normocephalic] : normocephalic [Atraumatic] : atraumatic [EOMI] : extra ocular movement intact [Supple] : supple [No Supraclavicular Adenopathy] : no supraclavicular adenopathy [No Cervical Adenopathy] : no cervical adenopathy [Examined in the supine and seated position] : examined in the supine and seated position [No dominant masses] : no dominant masses in right breast  [No dominant masses] : no dominant masses left breast [Breast Mass Right Breast ___cm] : no masses [No Axillary Lymphadenopathy] : no left axillary lymphadenopathy [No Edema] : no edema [No Rashes] : no rashes [No Ulceration] : no ulceration [de-identified] : On exam, the patient has bilateral total mastectomies and underwent a left axilla lymph node dissection and had bilateral MUSTAPHA flap reconstructions.  She did have some skin loss on the lower aspect of the right mastectomy wound but this has healed.  She also had an umbilical wound and lower abdominal wound which have healed.  On palpation, I cannot feel any suspicious densities in either MUSTAPHA flap.  She did get nipple reconstructions and tattooing.  She does have a density on the medial aspect of the left reconstruction which has a benign well circumscribed nature on directed ultrasound and is benign. [de-identified] : Status post prophylactic total mastectomy with MUSTAPHA flap reconstruction with no suspicious findings [de-identified] : Status post prophylactic total mastectomy with MUSTAPHA flap reconstruction with no evidence of recurrence.  Palpable left breast medial density which has been chronic and benign. [de-identified] : Medial left breast density with well-circumscribed appearance and has been chronic and consistent with a benign density.

## 2023-08-09 ENCOUNTER — NON-APPOINTMENT (OUTPATIENT)
Age: 58
End: 2023-08-09

## 2023-10-28 ENCOUNTER — NON-APPOINTMENT (OUTPATIENT)
Age: 58
End: 2023-10-28

## 2023-11-07 ENCOUNTER — APPOINTMENT (OUTPATIENT)
Dept: BREAST CENTER | Facility: CLINIC | Age: 58
End: 2023-11-07
Payer: COMMERCIAL

## 2023-11-07 VITALS
DIASTOLIC BLOOD PRESSURE: 74 MMHG | OXYGEN SATURATION: 95 % | HEIGHT: 68 IN | BODY MASS INDEX: 44.41 KG/M2 | SYSTOLIC BLOOD PRESSURE: 136 MMHG | WEIGHT: 293 LBS | HEART RATE: 58 BPM

## 2023-11-07 PROCEDURE — 99213 OFFICE O/P EST LOW 20 MIN: CPT

## 2024-04-25 ENCOUNTER — NON-APPOINTMENT (OUTPATIENT)
Age: 59
End: 2024-04-25

## 2024-05-17 NOTE — REASON FOR VISIT
[Follow-Up: _____] : a [unfilled] follow-up visit [FreeTextEntry1] : The patient comes in with a strong family history of breast, pancreatic, and ovarian cancer.  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  The patient was diagnosed with a multicentric left breast cancer after screening mammography and ultrasound in May 2019.  She was found to have a left breast 1:00 and 11:00 invasive mammary carcinoma with mixed ductal and lobular phenotypes which were ER/MA strongly positive and HER-2/shayy negative with high Ki-67's.  She underwent bilateral total mastectomies and required a left axillary lymph node dissection and had bilateral MUSTAPHA flap reconstructions in June 2019.  Final pathology showed a multicentric left breast cancer as an infiltrating lobular cancer with 3 separate foci the largest measuring 1.8 cm but she had free margins and 1 out of 19 positive nodes with extranodal extension making this a pathologic prognostic stage IA breast cancer.  She underwent chemotherapy but after radiation oncology evaluation it was felt that no radiation was needed and she was placed on an AI but had to come off due to tachycardic events.  She comes in now for routine follow-up.

## 2024-05-17 NOTE — PHYSICAL EXAM
[Normocephalic] : normocephalic [Atraumatic] : atraumatic [EOMI] : extra ocular movement intact [Supple] : supple [No Supraclavicular Adenopathy] : no supraclavicular adenopathy [No Cervical Adenopathy] : no cervical adenopathy [Examined in the supine and seated position] : examined in the supine and seated position [No dominant masses] : no dominant masses in right breast  [No dominant masses] : no dominant masses left breast [Breast Mass Right Breast ___cm] : no masses [No Axillary Lymphadenopathy] : no left axillary lymphadenopathy [No Edema] : no edema [No Rashes] : no rashes [No Ulceration] : no ulceration [de-identified] : On exam, the patient has bilateral total mastectomies and underwent a left axilla lymph node dissection and had bilateral MUSTAPHA flap reconstructions.  She did have some skin loss on the lower aspect of the right mastectomy wound but this has healed.  She also had an umbilical wound and lower abdominal wound which have healed.  On palpation, I cannot feel any suspicious densities in either MUSTAPHA flap.  She did get nipple reconstructions and tattooing.  She does have a density on the medial aspect of the left reconstruction which has a benign well circumscribed nature on directed ultrasound and is benign. [de-identified] : Status post prophylactic total mastectomy with MUSTAPHA flap reconstruction with no suspicious findings [de-identified] : Status post prophylactic total mastectomy with MUSTAPHA flap reconstruction with no evidence of recurrence.  Palpable left breast medial density which has been chronic and benign. [de-identified] : Medial left breast density with well-circumscribed appearance and has been chronic and consistent with a benign density.

## 2024-05-17 NOTE — HISTORY OF PRESENT ILLNESS
[FreeTextEntry1] : The patient is a 58-year-old G3, P2 postmenopausal white female of [place], [place], and English descent.  She underwent menarche at age 12 and had her first child at age 26.  She has a strong family history with her sister who had breast cancer at age 60 who tested BRCA2 positive and her brother had pancreatic cancer at age 71 who also tested BRCA2 positive.  Another brother had prostate cancer at age 65.  She has a paternal uncle who had breast cancer at age 80.  Her paternal grandmother had ovarian cancer in her 40s.  Her father had colorectal cancer at age 68 and her mother had lung cancer at age 72.  The patient has a significant medical history of obesity.  She underwent a screening mammography and ultrasound on May 9, 2019 showing some calcifications and a slight density measuring about 9 mm in the upper inner aspect of the left breast.  Ultrasound showed a density in the left breast 1:00 region 2 cm from the nipple measuring 1.5 x 1.1 x 0.9 cm and there was also a separate suspicious lesion seen in the 11:00 aspect 3 cm from the nipple measuring 5 x 5 mm.  She underwent ultrasound-guided core biopsies of both of these areas on May 15, 2019 at [facility] showing invasive mammary carcinoma with mixed ductal and lobular phenotypes and these were both ER strongly positive at 100%, IA strongly positive at 95%, and HER-2/shayy negative by FISH.  One biopsy had a Ki-67 of 70% and the other had a Ki-67 of 45%.  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  She underwent bilateral total mastectomies with a left axillary sentinel lymph node biopsy followed by an axillary lymph node dissection for a positive sentinel lymph node and underwent bilateral MUSTAPHA flap reconstructions by Dr. Campbell and Dr. Laughlin on June 27, 2019.  Final pathology showed a multifocal multicentric left breast infiltrating lobular cancer with at least 3 foci in the upper quadrants measuring 1.3, 1.5, and 1.8 cm with other areas of DCIS in the upper inner quadrant.  All margins were free.  She had a total of 1 out of 9 positive lymph nodes with extranodal extension identified and the cancer remained ER/IA positive HER-2/shayy negative making this a pathologic prognostic stage IA breast cancer.  The right mastectomy just showed some classical type LCIS.  She was seen by Dr. Zavala who started her on chemotherapy.  She developed abdominal seromas in part of her lower abdominal wound opened up which required revision surgery by Dr. Campbell.  She finished chemotherapy on October 11, 2019 and now follows up with Dr. Lopez.  She was started on an aromatase inhibitor in January 2020 but has had tachycardic events on Arimidex as well as exemestane and remains off all endocrine therapy. She comes in now for routine follow-up.

## 2024-05-17 NOTE — ASSESSMENT
[FreeTextEntry1] : The patient is a 58-year-old G3, P2 postmenopausal white female of [place], [place], and English descent. She underwent menarche at age 12 and had her first child at age 26. She has a strong family history with her sister who had breast cancer at age 60 who tested BRCA2 positive and her brother had pancreatic cancer at age 71 who also tested BRCA2 positive. Another brother had prostate cancer at age 65. She has a paternal uncle who had breast cancer at age 80. Her paternal grandmother had ovarian cancer in her 40s. Her father had colorectal cancer at age 68 and her mother had lung cancer at age 72. The patient has a significant medical history of obesity. She underwent a screening mammography and ultrasound on May 9, 2019 showing some calcifications and a slight density measuring about 9 mm in the upper inner aspect of the left breast. Ultrasound showed a density in the left breast 1:00 region 2 cm from the nipple measuring 1.5 x 1.1 x 0.9 cm and there was also a separate suspicious lesion seen in the 11:00 aspect 3 cm from the nipple measuring 5 x 5 mm. She underwent ultrasound-guided core biopsies of both of these areas on May 15, 2019 at [facility] showing invasive mammary carcinoma with mixed ductal and lobular phenotypes and these were both ER strongly positive at 100%, DE strongly positive at 95%, and HER-2/shayy negative by FISH. One biopsy had a Ki-67 of 70% and the other had a Ki-67 of 45%. 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 She underwent bilateral total mastectomies with a left axillary sentinel lymph node biopsy followed by an axillary lymph node dissection for a positive sentinel lymph node and underwent bilateral MUSTAPHA flap reconstructions by Dr. Campbell and Dr. Laughlin on June 27, 2019. Final pathology showed a multifocal multicentric left breast infiltrating lobular cancer with at least 3 foci in the upper quadrants measuring 1.3, 1.5, and 1.8 cm with other areas of DCIS in the upper inner quadrant. All margins were free. She had a total of 1 out of 9 positive lymph nodes with extranodal extension identified and the cancer remained ER/DE positive HER-2/shayy negative making this a pathologic prognostic stage IA breast cancer. The right mastectomy just showed some classical type LCIS. She was seen by Dr. Zavala who started her on chemotherapy. She developed abdominal seromas and part of her lower abdominal wound opened up which required revision surgery by Dr. Campbell. She finished chemotherapy on October 11, 2019 and now follows up with Dr. Lopez. 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 On exam today, I cannot feel any suspicious findings over either MUSTAPHA flap reconstruction and a previously felt left breast medial density has resolved. The patient was reassured and should follow-up again in 1 year in May 2025. She should continue to follow-up with Dr. Lopez and he has decided to keep her off endocrine therapy. Of note, Dr. Lopez is going to retire soon ???????.

## 2024-05-24 ENCOUNTER — APPOINTMENT (OUTPATIENT)
Dept: BREAST CENTER | Facility: CLINIC | Age: 59
End: 2024-05-24
Payer: COMMERCIAL

## 2024-05-24 VITALS
SYSTOLIC BLOOD PRESSURE: 131 MMHG | HEIGHT: 67.5 IN | WEIGHT: 293 LBS | OXYGEN SATURATION: 97 % | TEMPERATURE: 97 F | DIASTOLIC BLOOD PRESSURE: 73 MMHG | HEART RATE: 61 BPM | BODY MASS INDEX: 45.45 KG/M2

## 2024-05-24 DIAGNOSIS — Z85.3 PERSONAL HISTORY OF MALIGNANT NEOPLASM OF BREAST: ICD-10-CM

## 2024-05-24 DIAGNOSIS — Z80.3 FAMILY HISTORY OF MALIGNANT NEOPLASM OF BREAST: ICD-10-CM

## 2024-05-24 DIAGNOSIS — Z12.39 ENCOUNTER FOR OTHER SCREENING FOR MALIGNANT NEOPLASM OF BREAST: ICD-10-CM

## 2024-05-24 PROCEDURE — 99212 OFFICE O/P EST SF 10 MIN: CPT

## 2024-05-24 NOTE — REASON FOR VISIT
[Follow-Up: _____] : a [unfilled] follow-up visit [FreeTextEntry1] : The patient comes in with a strong family history of breast, pancreatic, and ovarian cancer.  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  The patient was diagnosed with a multicentric left breast cancer after screening mammography and ultrasound in May 2019.  She was found to have a left breast 1:00 and 11:00 invasive mammary carcinoma with mixed ductal and lobular phenotypes which were ER/LA strongly positive and HER-2/shayy negative with high Ki-67's.  She underwent bilateral total mastectomies and required a left axillary lymph node dissection and had bilateral MUSTAPHA flap reconstructions in June 2019.  Final pathology showed a multicentric left breast cancer as an infiltrating lobular cancer with 3 separate foci the largest measuring 1.8 cm but she had free margins and 1 out of 19 positive nodes with extranodal extension making this a pathologic prognostic stage IA breast cancer.  She underwent chemotherapy but after radiation oncology evaluation it was felt that no radiation was needed and she was placed on an AI but had to come off due to tachycardic events.  She comes in now for routine follow-up.

## 2024-05-24 NOTE — PAST MEDICAL HISTORY
Last ordered:05/08/23    Last seen:08/24/23  Upcoming appt:11/09/23     [Postmenopausal] : The patient is postmenopausal [Menarche Age ____] : age at menarche was [unfilled] [Total Preg ___] : G[unfilled] [Live Births ___] : P[unfilled]  [Age At Live Birth ___] : Age at live birth: [unfilled] [History of Hormone Replacement Treatment] : has no history of hormone replacement treatment

## 2024-05-24 NOTE — ASSESSMENT
[FreeTextEntry1] : The patient is a 58-year-old G3, P2 postmenopausal white female of [place], [place], and English descent. She underwent menarche at age 12 and had her first child at age 26. She has a strong family history with her sister who had breast cancer at age 60 who tested BRCA2 positive and her brother had pancreatic cancer at age 71 who also tested BRCA2 positive. Another brother had prostate cancer at age 65. She has a paternal uncle who had breast cancer at age 80. Her paternal grandmother had ovarian cancer in her 40s. Her father had colorectal cancer at age 68 and her mother had lung cancer at age 72. The patient has a significant medical history of obesity. She underwent a screening mammography and ultrasound on May 9, 2019 showing some calcifications and a slight density measuring about 9 mm in the upper inner aspect of the left breast. Ultrasound showed a density in the left breast 1:00 region 2 cm from the nipple measuring 1.5 x 1.1 x 0.9 cm and there was also a separate suspicious lesion seen in the 11:00 aspect 3 cm from the nipple measuring 5 x 5 mm. She underwent ultrasound-guided core biopsies of both of these areas on May 15, 2019 at [facility] showing invasive mammary carcinoma with mixed ductal and lobular phenotypes and these were both ER strongly positive at 100%, NY strongly positive at 95%, and HER-2/shayy negative by FISH. One biopsy had a Ki-67 of 70% and the other had a Ki-67 of 45%. 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 She underwent bilateral total mastectomies with a left axillary sentinel lymph node biopsy followed by an axillary lymph node dissection for a positive sentinel lymph node and underwent bilateral MUSTAPHA flap reconstructions by Dr. Campbell and Dr. Laughlin on June 27, 2019. Final pathology showed a multifocal multicentric left breast infiltrating lobular cancer with at least 3 foci in the upper quadrants measuring 1.3, 1.5, and 1.8 cm with other areas of DCIS in the upper inner quadrant. All margins were free. She had a total of 1 out of 9 positive lymph nodes with extranodal extension identified and the cancer remained ER/NY positive HER-2/shayy negative making this a pathologic prognostic stage IA breast cancer. The right mastectomy just showed some classical type LCIS. She was seen by Dr. Zavala who started her on chemotherapy. She developed abdominal seromas and part of her lower abdominal wound opened up which required revision surgery by Dr. Campbell. She finished chemotherapy on October 11, 2019 and now follows up with Dr. Lopez. 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 On exam today, I cannot feel any suspicious findings over either MUSTAPHA flap reconstruction and a previously felt left breast medial density has resolved. The patient was reassured and should follow-up again in 1 year in May 2025. She should continue to follow-up with Dr. Lopez and he has decided to keep her off endocrine therapy.

## 2024-05-24 NOTE — PHYSICAL EXAM
[Normocephalic] : normocephalic [Atraumatic] : atraumatic [EOMI] : extra ocular movement intact [Supple] : supple [No Supraclavicular Adenopathy] : no supraclavicular adenopathy [No Cervical Adenopathy] : no cervical adenopathy [Examined in the supine and seated position] : examined in the supine and seated position [No dominant masses] : no dominant masses in right breast  [No dominant masses] : no dominant masses left breast [Breast Mass Right Breast ___cm] : no masses [No Axillary Lymphadenopathy] : no left axillary lymphadenopathy [No Edema] : no edema [No Rashes] : no rashes [No Ulceration] : no ulceration [de-identified] : On exam, the patient has bilateral total mastectomies and underwent a left axilla lymph node dissection and had bilateral MUSTAPHA flap reconstructions.  She did have some skin loss on the lower aspect of the right mastectomy wound but this has healed.  She also had an umbilical wound and lower abdominal wound which have healed.  On palpation, I cannot feel any suspicious densities in either MUSTAPHA flap.  She did get nipple reconstructions and tattooing.  She does have a density on the medial aspect of the left reconstruction which has a benign well circumscribed nature on directed ultrasound and is benign. [de-identified] : Status post prophylactic total mastectomy with MUSTAPHA flap reconstruction with no suspicious findings [de-identified] : Status post prophylactic total mastectomy with MUSTAPHA flap reconstruction with no evidence of recurrence.  Palpable left breast medial density which has been chronic and benign. [de-identified] : Medial left breast density with well-circumscribed appearance and has been chronic and consistent with a benign density.

## 2024-05-24 NOTE — HISTORY OF PRESENT ILLNESS
[FreeTextEntry1] : The patient is a 58-year-old G3, P2 postmenopausal white female of [place], [place], and English descent.  She underwent menarche at age 12 and had her first child at age 26.  She has a strong family history with her sister who had breast cancer at age 60 who tested BRCA2 positive and her brother had pancreatic cancer at age 71 who also tested BRCA2 positive.  Another brother had prostate cancer at age 65.  She has a paternal uncle who had breast cancer at age 80.  Her paternal grandmother had ovarian cancer in her 40s.  Her father had colorectal cancer at age 68 and her mother had lung cancer at age 72.  The patient has a significant medical history of obesity.  She underwent a screening mammography and ultrasound on May 9, 2019 showing some calcifications and a slight density measuring about 9 mm in the upper inner aspect of the left breast.  Ultrasound showed a density in the left breast 1:00 region 2 cm from the nipple measuring 1.5 x 1.1 x 0.9 cm and there was also a separate suspicious lesion seen in the 11:00 aspect 3 cm from the nipple measuring 5 x 5 mm.  She underwent ultrasound-guided core biopsies of both of these areas on May 15, 2019 at [facility] showing invasive mammary carcinoma with mixed ductal and lobular phenotypes and these were both ER strongly positive at 100%, ID strongly positive at 95%, and HER-2/shayy negative by FISH.  One biopsy had a Ki-67 of 70% and the other had a Ki-67 of 45%.  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  She underwent bilateral total mastectomies with a left axillary sentinel lymph node biopsy followed by an axillary lymph node dissection for a positive sentinel lymph node and underwent bilateral MUSTAPHA flap reconstructions by Dr. Campbell and Dr. Laughlin on June 27, 2019.  Final pathology showed a multifocal multicentric left breast infiltrating lobular cancer with at least 3 foci in the upper quadrants measuring 1.3, 1.5, and 1.8 cm with other areas of DCIS in the upper inner quadrant.  All margins were free.  She had a total of 1 out of 9 positive lymph nodes with extranodal extension identified and the cancer remained ER/ID positive HER-2/shayy negative making this a pathologic prognostic stage IA breast cancer.  The right mastectomy just showed some classical type LCIS.  She was seen by Dr. Zavala who started her on chemotherapy.  She developed abdominal seromas in part of her lower abdominal wound opened up which required revision surgery by Dr. Campbell.  She finished chemotherapy on October 11, 2019 and now follows up with Dr. Lopez.  She was started on an aromatase inhibitor in January 2020 but has had tachycardic events on Arimidex as well as exemestane and remains off all endocrine therapy. She comes in now for routine follow-up.

## 2025-04-10 ENCOUNTER — NON-APPOINTMENT (OUTPATIENT)
Age: 60
End: 2025-04-10

## 2025-06-13 ENCOUNTER — APPOINTMENT (OUTPATIENT)
Dept: BREAST CENTER | Facility: CLINIC | Age: 60
End: 2025-06-13
Payer: COMMERCIAL

## 2025-06-13 ENCOUNTER — NON-APPOINTMENT (OUTPATIENT)
Age: 60
End: 2025-06-13

## 2025-06-13 VITALS — WEIGHT: 293 LBS | HEIGHT: 67.5 IN | BODY MASS INDEX: 45.45 KG/M2

## 2025-06-13 PROCEDURE — 99213 OFFICE O/P EST LOW 20 MIN: CPT

## 2025-06-13 RX ORDER — FUROSEMIDE 20 MG/1
20 TABLET ORAL
Refills: 0 | Status: ACTIVE | COMMUNITY

## 2025-06-13 RX ORDER — APIXABAN 5 MG/1
5 TABLET, FILM COATED ORAL
Refills: 0 | Status: ACTIVE | COMMUNITY

## 2025-06-13 RX ORDER — ASPIRIN 81 MG
81 TABLET,CHEWABLE ORAL
Refills: 0 | Status: ACTIVE | COMMUNITY

## 2025-06-13 RX ORDER — METOPROLOL TARTRATE 100 MG/1
100 TABLET ORAL
Refills: 0 | Status: ACTIVE | COMMUNITY